# Patient Record
Sex: MALE | Race: BLACK OR AFRICAN AMERICAN | HISPANIC OR LATINO
[De-identification: names, ages, dates, MRNs, and addresses within clinical notes are randomized per-mention and may not be internally consistent; named-entity substitution may affect disease eponyms.]

---

## 2021-10-11 ENCOUNTER — TRANSCRIPTION ENCOUNTER (OUTPATIENT)
Age: 36
End: 2021-10-11

## 2022-03-14 ENCOUNTER — INPATIENT (INPATIENT)
Facility: HOSPITAL | Age: 37
LOS: 6 days | Discharge: HOME HEALTH | DRG: 871 | End: 2022-03-21
Attending: SURGERY | Admitting: SURGERY
Payer: MEDICAID

## 2022-03-14 VITALS
WEIGHT: 315 LBS | HEART RATE: 110 BPM | DIASTOLIC BLOOD PRESSURE: 72 MMHG | SYSTOLIC BLOOD PRESSURE: 118 MMHG | TEMPERATURE: 100 F | OXYGEN SATURATION: 98 % | RESPIRATION RATE: 17 BRPM | HEIGHT: 67 IN

## 2022-03-14 DIAGNOSIS — K57.80 DIVERTICULITIS OF INTESTINE, PART UNSPECIFIED, WITH PERFORATION AND ABSCESS WITHOUT BLEEDING: ICD-10-CM

## 2022-03-14 LAB
ALBUMIN SERPL ELPH-MCNC: 3.8 G/DL — SIGNIFICANT CHANGE UP (ref 3.5–5)
ALP SERPL-CCNC: 68 U/L — SIGNIFICANT CHANGE UP (ref 40–120)
ALT FLD-CCNC: 18 U/L DA — SIGNIFICANT CHANGE UP (ref 10–60)
ANION GAP SERPL CALC-SCNC: 7 MMOL/L — SIGNIFICANT CHANGE UP (ref 5–17)
AST SERPL-CCNC: 15 U/L — SIGNIFICANT CHANGE UP (ref 10–40)
BASOPHILS # BLD AUTO: 0 K/UL — SIGNIFICANT CHANGE UP (ref 0–0.2)
BASOPHILS NFR BLD AUTO: 0 % — SIGNIFICANT CHANGE UP (ref 0–2)
BILIRUB SERPL-MCNC: 1.3 MG/DL — HIGH (ref 0.2–1.2)
BUN SERPL-MCNC: 11 MG/DL — SIGNIFICANT CHANGE UP (ref 7–18)
CALCIUM SERPL-MCNC: 9.2 MG/DL — SIGNIFICANT CHANGE UP (ref 8.4–10.5)
CHLORIDE SERPL-SCNC: 102 MMOL/L — SIGNIFICANT CHANGE UP (ref 96–108)
CO2 SERPL-SCNC: 27 MMOL/L — SIGNIFICANT CHANGE UP (ref 22–31)
CREAT SERPL-MCNC: 1.16 MG/DL — SIGNIFICANT CHANGE UP (ref 0.5–1.3)
EGFR: 83 ML/MIN/1.73M2 — SIGNIFICANT CHANGE UP
EOSINOPHIL # BLD AUTO: 0 K/UL — SIGNIFICANT CHANGE UP (ref 0–0.5)
EOSINOPHIL NFR BLD AUTO: 0 % — SIGNIFICANT CHANGE UP (ref 0–6)
GLUCOSE SERPL-MCNC: 126 MG/DL — HIGH (ref 70–99)
HCT VFR BLD CALC: 45.2 % — SIGNIFICANT CHANGE UP (ref 39–50)
HGB BLD-MCNC: 14.8 G/DL — SIGNIFICANT CHANGE UP (ref 13–17)
LIDOCAIN IGE QN: 19 U/L — LOW (ref 73–393)
LYMPHOCYTES # BLD AUTO: 1.11 K/UL — SIGNIFICANT CHANGE UP (ref 1–3.3)
LYMPHOCYTES # BLD AUTO: 4 % — LOW (ref 13–44)
MCHC RBC-ENTMCNC: 27.7 PG — SIGNIFICANT CHANGE UP (ref 27–34)
MCHC RBC-ENTMCNC: 32.7 GM/DL — SIGNIFICANT CHANGE UP (ref 32–36)
MCV RBC AUTO: 84.5 FL — SIGNIFICANT CHANGE UP (ref 80–100)
MONOCYTES # BLD AUTO: 0.56 K/UL — SIGNIFICANT CHANGE UP (ref 0–0.9)
MONOCYTES NFR BLD AUTO: 2 % — SIGNIFICANT CHANGE UP (ref 2–14)
NEUTROPHILS # BLD AUTO: 26.19 K/UL — HIGH (ref 1.8–7.4)
NEUTROPHILS NFR BLD AUTO: 94 % — HIGH (ref 43–77)
PLATELET # BLD AUTO: 297 K/UL — SIGNIFICANT CHANGE UP (ref 150–400)
POTASSIUM SERPL-MCNC: 3.6 MMOL/L — SIGNIFICANT CHANGE UP (ref 3.5–5.3)
POTASSIUM SERPL-SCNC: 3.6 MMOL/L — SIGNIFICANT CHANGE UP (ref 3.5–5.3)
PROT SERPL-MCNC: 7.7 G/DL — SIGNIFICANT CHANGE UP (ref 6–8.3)
RBC # BLD: 5.35 M/UL — SIGNIFICANT CHANGE UP (ref 4.2–5.8)
RBC # FLD: 13.3 % — SIGNIFICANT CHANGE UP (ref 10.3–14.5)
SARS-COV-2 RNA SPEC QL NAA+PROBE: SIGNIFICANT CHANGE UP
SODIUM SERPL-SCNC: 136 MMOL/L — SIGNIFICANT CHANGE UP (ref 135–145)
WBC # BLD: 27.86 K/UL — HIGH (ref 3.8–10.5)
WBC # FLD AUTO: 27.86 K/UL — HIGH (ref 3.8–10.5)

## 2022-03-14 PROCEDURE — 74177 CT ABD & PELVIS W/CONTRAST: CPT | Mod: 26,ME

## 2022-03-14 PROCEDURE — 99222 1ST HOSP IP/OBS MODERATE 55: CPT

## 2022-03-14 PROCEDURE — 99285 EMERGENCY DEPT VISIT HI MDM: CPT

## 2022-03-14 PROCEDURE — G1004: CPT

## 2022-03-14 RX ORDER — SODIUM CHLORIDE 9 MG/ML
1000 INJECTION INTRAMUSCULAR; INTRAVENOUS; SUBCUTANEOUS ONCE
Refills: 0 | Status: COMPLETED | OUTPATIENT
Start: 2022-03-14 | End: 2022-03-14

## 2022-03-14 RX ORDER — KETOROLAC TROMETHAMINE 30 MG/ML
30 SYRINGE (ML) INJECTION ONCE
Refills: 0 | Status: DISCONTINUED | OUTPATIENT
Start: 2022-03-14 | End: 2022-03-14

## 2022-03-14 RX ORDER — PIPERACILLIN AND TAZOBACTAM 4; .5 G/20ML; G/20ML
3.38 INJECTION, POWDER, LYOPHILIZED, FOR SOLUTION INTRAVENOUS ONCE
Refills: 0 | Status: COMPLETED | OUTPATIENT
Start: 2022-03-14 | End: 2022-03-14

## 2022-03-14 RX ORDER — ONDANSETRON 8 MG/1
4 TABLET, FILM COATED ORAL EVERY 6 HOURS
Refills: 0 | Status: DISCONTINUED | OUTPATIENT
Start: 2022-03-14 | End: 2022-03-21

## 2022-03-14 RX ORDER — ENOXAPARIN SODIUM 100 MG/ML
40 INJECTION SUBCUTANEOUS EVERY 24 HOURS
Refills: 0 | Status: DISCONTINUED | OUTPATIENT
Start: 2022-03-14 | End: 2022-03-21

## 2022-03-14 RX ORDER — MORPHINE SULFATE 50 MG/1
4 CAPSULE, EXTENDED RELEASE ORAL ONCE
Refills: 0 | Status: DISCONTINUED | OUTPATIENT
Start: 2022-03-14 | End: 2022-03-14

## 2022-03-14 RX ORDER — ACETAMINOPHEN 500 MG
1000 TABLET ORAL ONCE
Refills: 0 | Status: COMPLETED | OUTPATIENT
Start: 2022-03-14 | End: 2022-03-14

## 2022-03-14 RX ORDER — PIPERACILLIN AND TAZOBACTAM 4; .5 G/20ML; G/20ML
3.38 INJECTION, POWDER, LYOPHILIZED, FOR SOLUTION INTRAVENOUS EVERY 8 HOURS
Refills: 0 | Status: COMPLETED | OUTPATIENT
Start: 2022-03-14 | End: 2022-03-21

## 2022-03-14 RX ORDER — SODIUM CHLORIDE 9 MG/ML
1000 INJECTION INTRAMUSCULAR; INTRAVENOUS; SUBCUTANEOUS
Refills: 0 | Status: DISCONTINUED | OUTPATIENT
Start: 2022-03-14 | End: 2022-03-18

## 2022-03-14 RX ORDER — HYDROMORPHONE HYDROCHLORIDE 2 MG/ML
0.5 INJECTION INTRAMUSCULAR; INTRAVENOUS; SUBCUTANEOUS EVERY 4 HOURS
Refills: 0 | Status: DISCONTINUED | OUTPATIENT
Start: 2022-03-14 | End: 2022-03-16

## 2022-03-14 RX ORDER — ONDANSETRON 8 MG/1
4 TABLET, FILM COATED ORAL ONCE
Refills: 0 | Status: COMPLETED | OUTPATIENT
Start: 2022-03-14 | End: 2022-03-14

## 2022-03-14 RX ADMIN — MORPHINE SULFATE 4 MILLIGRAM(S): 50 CAPSULE, EXTENDED RELEASE ORAL at 21:00

## 2022-03-14 RX ADMIN — Medication 30 MILLIGRAM(S): at 13:35

## 2022-03-14 RX ADMIN — SODIUM CHLORIDE 1000 MILLILITER(S): 9 INJECTION INTRAMUSCULAR; INTRAVENOUS; SUBCUTANEOUS at 13:15

## 2022-03-14 RX ADMIN — SODIUM CHLORIDE 150 MILLILITER(S): 9 INJECTION INTRAMUSCULAR; INTRAVENOUS; SUBCUTANEOUS at 21:53

## 2022-03-14 RX ADMIN — ONDANSETRON 4 MILLIGRAM(S): 8 TABLET, FILM COATED ORAL at 13:15

## 2022-03-14 RX ADMIN — Medication 30 MILLIGRAM(S): at 13:15

## 2022-03-14 RX ADMIN — MORPHINE SULFATE 4 MILLIGRAM(S): 50 CAPSULE, EXTENDED RELEASE ORAL at 20:22

## 2022-03-14 RX ADMIN — PIPERACILLIN AND TAZOBACTAM 3.38 GRAM(S): 4; .5 INJECTION, POWDER, LYOPHILIZED, FOR SOLUTION INTRAVENOUS at 21:00

## 2022-03-14 RX ADMIN — Medication 400 MILLIGRAM(S): at 23:55

## 2022-03-14 RX ADMIN — PIPERACILLIN AND TAZOBACTAM 200 GRAM(S): 4; .5 INJECTION, POWDER, LYOPHILIZED, FOR SOLUTION INTRAVENOUS at 20:22

## 2022-03-14 NOTE — ED PROVIDER NOTE - OBJECTIVE STATEMENT
Patient reports constipation and LLQ pain starting on 3/9. On 3/11, patient took imodium (misunderstood the indication). Pain continued to get worse. Yesterday, took ducolax. Became nauseous. Vomited overnight, NBNB. Decreased appetite and chills for 2 days. Has urinated twice today. No fever, cp, sob, diarrhea, dysuria, urgency, frequency.  Triage wrote "fever" but patient never check temperature.

## 2022-03-14 NOTE — H&P ADULT - NSHPADDITIONALINFOADULT_GEN_ALL_CORE
pt with tenderness in the left LLQ abdomen  Clinically stable  Discussed the options with the pt  ID involved pt with tenderness in the left LLQ abdomen  Clinically stable  Discussed the options with the pt  ID involved    Pt had sepsis on arrival  elevated wbc count, high fever and tachycardia

## 2022-03-14 NOTE — ED ADULT NURSE NOTE - NSFALLRSKASSESSDT_ED_ALL_ED
14-Mar-2022 13:47 Eyes with no visual disturbances.  Ears clean and dry and no hearing difficulties. Nose with pink mucosa and no drainage.  Mouth mucous membranes moist and pink.  No tenderness or swelling to throat or neck.

## 2022-03-14 NOTE — H&P ADULT - ASSESSMENT
35 yo M with perforated sigmoid colon with extraluminal air, unknown etiology. Likely contained considering chronicity. Pt without signs of peritonitis.    1. Admit to surgery  2. NPO  3. IV fluids  4. IV antibiotics  5. ID consult  6. Serial abd exams  7. D/w Dr. Lee and agreed

## 2022-03-14 NOTE — H&P ADULT - NEGATIVE GENERAL GENITOURINARY SYMPTOMS
no hematuria/no flank pain L/no flank pain R/no gas in urine/no incontinence/normal urinary frequency

## 2022-03-14 NOTE — H&P ADULT - HISTORY OF PRESENT ILLNESS
37 yo M prediabetic presents c/o left lower abdominal pain that began 3/9 after eating.  Pt states he had constipation and then took imodium. Pt states pain worsened. He then took dulcolax and then developed vomiting.  Pt states the pain worsened prompting him to come to emergency room. PT states pain is worse with movement and improves when he stays still. Pt admits to chills but denies fever.  Pt admits to similar pain in 2018 which resolved spontaneously. Admits to having BM/Flatus. + voiding + dysuria, urine dark

## 2022-03-14 NOTE — ED ADULT NURSE NOTE - OBJECTIVE STATEMENT
presented to the ED sent from urgent care w/ a 3 day of generalized abdominal pain associated with constipation , nausea and vomiting . reports took Dulcolax w/ very minimal relief of discomforts and constipation .

## 2022-03-14 NOTE — ED PROVIDER NOTE - PROGRESS NOTE DETAILS
Patient is resting comfortably, NAD. Signed out to Dr. Hudson pending CTAP Hudson: surgery called and ct shows perforated sigmoid. zosyn given. admit to surgery

## 2022-03-15 ENCOUNTER — TRANSCRIPTION ENCOUNTER (OUTPATIENT)
Age: 37
End: 2022-03-15

## 2022-03-15 LAB
ANION GAP SERPL CALC-SCNC: 5 MMOL/L — SIGNIFICANT CHANGE UP (ref 5–17)
BASOPHILS # BLD AUTO: 0 K/UL — SIGNIFICANT CHANGE UP (ref 0–0.2)
BASOPHILS NFR BLD AUTO: 0 % — SIGNIFICANT CHANGE UP (ref 0–2)
BUN SERPL-MCNC: 12 MG/DL — SIGNIFICANT CHANGE UP (ref 7–18)
CALCIUM SERPL-MCNC: 8 MG/DL — LOW (ref 8.4–10.5)
CHLORIDE SERPL-SCNC: 107 MMOL/L — SIGNIFICANT CHANGE UP (ref 96–108)
CO2 SERPL-SCNC: 28 MMOL/L — SIGNIFICANT CHANGE UP (ref 22–31)
CREAT SERPL-MCNC: 0.89 MG/DL — SIGNIFICANT CHANGE UP (ref 0.5–1.3)
EGFR: 112 ML/MIN/1.73M2 — SIGNIFICANT CHANGE UP
EOSINOPHIL # BLD AUTO: 0 K/UL — SIGNIFICANT CHANGE UP (ref 0–0.5)
EOSINOPHIL NFR BLD AUTO: 0 % — SIGNIFICANT CHANGE UP (ref 0–6)
GLUCOSE SERPL-MCNC: 105 MG/DL — HIGH (ref 70–99)
HCT VFR BLD CALC: 39.6 % — SIGNIFICANT CHANGE UP (ref 39–50)
HGB BLD-MCNC: 12.6 G/DL — LOW (ref 13–17)
LYMPHOCYTES # BLD AUTO: 0.89 K/UL — LOW (ref 1–3.3)
LYMPHOCYTES # BLD AUTO: 4 % — LOW (ref 13–44)
MCHC RBC-ENTMCNC: 27.8 PG — SIGNIFICANT CHANGE UP (ref 27–34)
MCHC RBC-ENTMCNC: 31.8 GM/DL — LOW (ref 32–36)
MCV RBC AUTO: 87.2 FL — SIGNIFICANT CHANGE UP (ref 80–100)
MONOCYTES # BLD AUTO: 0 K/UL — SIGNIFICANT CHANGE UP (ref 0–0.9)
MONOCYTES NFR BLD AUTO: 0 % — LOW (ref 2–14)
NEUTROPHILS # BLD AUTO: 20.99 K/UL — HIGH (ref 1.8–7.4)
NEUTROPHILS NFR BLD AUTO: 90 % — HIGH (ref 43–77)
PLATELET # BLD AUTO: 246 K/UL — SIGNIFICANT CHANGE UP (ref 150–400)
POTASSIUM SERPL-MCNC: 3.4 MMOL/L — LOW (ref 3.5–5.3)
POTASSIUM SERPL-SCNC: 3.4 MMOL/L — LOW (ref 3.5–5.3)
RBC # BLD: 4.54 M/UL — SIGNIFICANT CHANGE UP (ref 4.2–5.8)
RBC # FLD: 13.8 % — SIGNIFICANT CHANGE UP (ref 10.3–14.5)
SODIUM SERPL-SCNC: 140 MMOL/L — SIGNIFICANT CHANGE UP (ref 135–145)
WBC # BLD: 22.33 K/UL — HIGH (ref 3.8–10.5)
WBC # FLD AUTO: 22.33 K/UL — HIGH (ref 3.8–10.5)

## 2022-03-15 PROCEDURE — 99232 SBSQ HOSP IP/OBS MODERATE 35: CPT

## 2022-03-15 RX ORDER — SODIUM CHLORIDE 9 MG/ML
1000 INJECTION INTRAMUSCULAR; INTRAVENOUS; SUBCUTANEOUS ONCE
Refills: 0 | Status: COMPLETED | OUTPATIENT
Start: 2022-03-15 | End: 2022-03-15

## 2022-03-15 RX ORDER — ACETAMINOPHEN 500 MG
1000 TABLET ORAL ONCE
Refills: 0 | Status: COMPLETED | OUTPATIENT
Start: 2022-03-15 | End: 2022-03-15

## 2022-03-15 RX ORDER — ACETAMINOPHEN 500 MG
650 TABLET ORAL EVERY 6 HOURS
Refills: 0 | Status: DISCONTINUED | OUTPATIENT
Start: 2022-03-15 | End: 2022-03-21

## 2022-03-15 RX ADMIN — ONDANSETRON 4 MILLIGRAM(S): 8 TABLET, FILM COATED ORAL at 16:46

## 2022-03-15 RX ADMIN — PIPERACILLIN AND TAZOBACTAM 25 GRAM(S): 4; .5 INJECTION, POWDER, LYOPHILIZED, FOR SOLUTION INTRAVENOUS at 05:20

## 2022-03-15 RX ADMIN — SODIUM CHLORIDE 1000 MILLILITER(S): 9 INJECTION INTRAMUSCULAR; INTRAVENOUS; SUBCUTANEOUS at 02:40

## 2022-03-15 RX ADMIN — Medication 1000 MILLIGRAM(S): at 15:07

## 2022-03-15 RX ADMIN — PIPERACILLIN AND TAZOBACTAM 25 GRAM(S): 4; .5 INJECTION, POWDER, LYOPHILIZED, FOR SOLUTION INTRAVENOUS at 21:28

## 2022-03-15 RX ADMIN — Medication 650 MILLIGRAM(S): at 19:41

## 2022-03-15 RX ADMIN — Medication 1000 MILLIGRAM(S): at 08:14

## 2022-03-15 RX ADMIN — PIPERACILLIN AND TAZOBACTAM 25 GRAM(S): 4; .5 INJECTION, POWDER, LYOPHILIZED, FOR SOLUTION INTRAVENOUS at 15:39

## 2022-03-15 RX ADMIN — Medication 400 MILLIGRAM(S): at 11:04

## 2022-03-15 RX ADMIN — SODIUM CHLORIDE 150 MILLILITER(S): 9 INJECTION INTRAMUSCULAR; INTRAVENOUS; SUBCUTANEOUS at 21:33

## 2022-03-15 RX ADMIN — Medication 650 MILLIGRAM(S): at 20:39

## 2022-03-15 NOTE — CONSULT NOTE ADULT - ASSESSMENT
Acute Perforated Diverticulitis  Focal peritonitis  Fevers  Leukocytosis - decreasing    Plan - Cont Zosyn 3.375 gms iv q8hrs  Repeat CT abdomen and pelvis with oral contrast on Thursday, if no abscess and finding improving then will plan to transition to po abxs upon discharge.

## 2022-03-15 NOTE — PROGRESS NOTE ADULT - SUBJECTIVE AND OBJECTIVE BOX
SUBJECTIVE: Pt reports pain has improved, but still has some left lower quadrant pain. Also reports some nausea because he states he is "burning up." Denies vomiting.     Vitals reviewed, Tmax 102.5F last night.     Exam:   General: awake, alert, appears comforatble  Respiratory: non-labored  Abdomen: obese, soft, tenderness elicited in LLQ upon palpation of left upper quadrant; no guarding, non-peritonitic

## 2022-03-15 NOTE — CHART NOTE - NSCHARTNOTEFT_GEN_A_CORE
Pt previously with fever 102.5  Resolved with IV Tylenol  BP soft - NS bolus given    Pt states feels better  Denies dizziness, headache  Abdominal pain improving  Not tender, not distended    Monitor BP

## 2022-03-15 NOTE — PROGRESS NOTE ADULT - ASSESSMENT
36M with perforated sigmoid colon with extraluminal air of unknown etiology  abdomen not acute at this time, non-peritonitic on exam    continue bowel rest: NPO/IVF  ID consult Dr. Mclaughlin  IV abx  discussed with Dr. Lee

## 2022-03-15 NOTE — DISCHARGE NOTE PROVIDER - NSDCMRMEDTOKEN_GEN_ALL_CORE_FT
cefuroxime 500 mg oral tablet: 1 tab(s) orally 2 times a day   metroNIDAZOLE 500 mg oral tablet: 1 tab(s) orally 2 times a day

## 2022-03-15 NOTE — DISCHARGE NOTE PROVIDER - HOSPITAL COURSE
36M prediabetic pt presents c/o left lower abdominal pain that began 3/9 after eating.  Pt states he had constipation and then took imodium. Pt states pain worsened. He then took dulcolax and then developed vomiting.  Pt states the pain worsened prompting him to come to emergency room. PT states pain is worse with movement and improves when he stays still. Pt admits to chills but denies fever.  Pt admits to similar pain in 2018 which resolved spontaneously. Admits to having BM/Flatus. + voiding + dysuria, urine dark. Pt underwent a CT scan that showed marked wall thickening of the proximal sigmoid colon with significant surrounding edema, localized adjacent extraluminal gas and subcentimeter lymph nodes. Findings are compatible with perforated colitis; underlying tumor cannot be excluded. No definite colonic diverticulosis is evident. No formed abscess at this time. Follow-up colonoscopy is recommended for further evaluation. Pt was admitted, was kept NPO, given IVF, IV abx and was evaluated by the infectious disease team.   INCOMPLETE   36M prediabetic pt presents c/o left lower abdominal pain that began 3/9 after eating.  Pt states he had constipation and then took imodium. Pt states pain worsened. He then took dulcolax and then developed vomiting.  Pt states the pain worsened prompting him to come to emergency room. PT states pain is worse with movement and improves when he stays still. Pt admits to chills but denies fever.  Pt admits to similar pain in 2018 which resolved spontaneously. Admits to having BM/Flatus. + voiding + dysuria, urine dark. Pt underwent a CT scan that showed marked wall thickening of the proximal sigmoid colon with significant surrounding edema, localized adjacent extraluminal gas and subcentimeter lymph nodes. Findings are compatible with perforated colitis; underlying tumor cannot be excluded. No definite colonic diverticulosis is evident. No formed abscess at this time. Follow-up colonoscopy is recommended for further evaluation. Pt was admitted, was kept NPO, given IVF, IV abx and was evaluated by the infectious disease team. Pt diet was advanced and tolerated regular diet without pain. Leukocytosis trended downward and pt planned for discharge 3-20-22

## 2022-03-15 NOTE — DISCHARGE NOTE PROVIDER - NSDCCPCAREPLAN_GEN_ALL_CORE_FT
PRINCIPAL DISCHARGE DIAGNOSIS  Diagnosis: Perforated diverticulum of intestine  Assessment and Plan of Treatment: Please follow-up with your surgeon Dr. Lee in the office upon discharge.       PRINCIPAL DISCHARGE DIAGNOSIS  Diagnosis: Perforated diverticulum of intestine  Assessment and Plan of Treatment: Please follow-up with your surgeon Dr. Lee in the office upon discharge.  Drink plenty of fluids and rest as needed. Call for any fever over 101, nausea, vomiting, severe pain, no passing of gas or bowel movement.   DIET  You may resume your regular diet as normal.   PAIN CONTROL  You may take Motrin or Tylenol, over the counter, as needed for mild pain. Stagger one medication 3 hours after the other for maximum pain control. Maximum daily dose of Tylenol should not exceed 4grams/day.  Antibiotics:   Please sienna Ceftin and Flagyl for 2 weeks. No alcohol consumption while taking the antibiotics as it can cause an adverse reactions.

## 2022-03-15 NOTE — CONSULT NOTE ADULT - SUBJECTIVE AND OBJECTIVE BOX
HPI:  37 yo M prediabetic presents c/o left lower abdominal pain that began 3/9 after eating.  Pt states he had constipation and then took imodium. Pt states pain worsened. He then took dulcolax and then developed vomiting.  Pt states the pain worsened prompting him to come to emergency room. PT states pain is worse with movement and improves when he stays still. Pt admits to chills but denies fever.  Pt admits to similar pain in 2018 which resolved spontaneously. Admits to having BM/Flatus. + voiding + dysuria, urine dark (14 Mar 2022 20:50)      PAST MEDICAL & SURGICAL HISTORY:  No pertinent past medical history    No significant past surgical history        No Known Drug Allergies  Shrimp (Hives)      Meds:  enoxaparin Injectable 40 milliGRAM(s) SubCutaneous every 24 hours  HYDROmorphone  Injectable 0.5 milliGRAM(s) IV Push every 4 hours PRN  ondansetron Injectable 4 milliGRAM(s) IV Push every 6 hours PRN  piperacillin/tazobactam IVPB.. 3.375 Gram(s) IV Intermittent every 8 hours  sodium chloride 0.9%. 1000 milliLiter(s) IV Continuous <Continuous>      SOCIAL HISTORY:  Smoker:  YES / NO        PACK YEARS:                         WHEN QUIT?  ETOH use:  YES / NO               FREQUENCY / QUANTITY:  Ilicit Drug use:  YES / NO  Occupation:  Assisted device use (Cane / Walker):  Live with:    FAMILY HISTORY:      VITALS:  Vital Signs Last 24 Hrs  T(C): 37.2 (15 Mar 2022 14:26), Max: 39.2 (14 Mar 2022 23:10)  T(F): 98.9 (15 Mar 2022 14:26), Max: 102.5 (14 Mar 2022 23:10)  HR: 95 (15 Mar 2022 14:26) (95 - 120)  BP: 130/77 (15 Mar 2022 14:26) (94/61 - 134/84)  BP(mean): --  RR: 18 (15 Mar 2022 14:26) (17 - 20)  SpO2: 95% (15 Mar 2022 14:26) (95% - 98%)    LABS/DIAGNOSTIC TESTS:                          12.6   22.33 )-----------( 246      ( 15 Mar 2022 05:49 )             39.6     WBC Count: 22.33 K/uL (03-15 @ 05:49)  WBC Count: 27.86 K/uL (03-14 @ 13:32)      03-15    140  |  107  |  12  ----------------------------<  105<H>  3.4<L>   |  28  |  0.89    Ca    8.0<L>      15 Mar 2022 05:49    TPro  7.7  /  Alb  3.8  /  TBili  1.3<H>  /  DBili  x   /  AST  15  /  ALT  18  /  AlkPhos  68  03-14          LIVER FUNCTIONS - ( 14 Mar 2022 13:32 )  Alb: 3.8 g/dL / Pro: 7.7 g/dL / ALK PHOS: 68 U/L / ALT: 18 U/L DA / AST: 15 U/L / GGT: x                 LACTATE:    ABG -     CULTURES:       RADIOLOGY:< from: CT Abdomen and Pelvis w/ IV Cont (03.14.22 @ 18:53) >  ACC: [id number] EXAM:  CT ABDOMEN AND PELVIS IC                          PROCEDURE DATE:  03/14/2022          INTERPRETATION:  CLINICAL INFORMATION: Abdominal pain, fever.    COMPARISON: None.    CONTRAST/COMPLICATIONS:  IV Contrast: Omnipaque 350  90 cc administered   10 cc discarded  Oral Contrast: NONE  Complications: None reported at time of study completion    PROCEDURE:  CT of the Abdomen and Pelvis was performed.  Sagittal and coronal reformats were performed.    FINDINGS:  LOWER CHEST: Linear bibasilar atelectasis.    LIVER: Within normal limits.  BILE DUCTS: Normal caliber.  GALLBLADDER: Within normal limits.  SPLEEN: Within normal limits.  PANCREAS: Fatty changes.  ADRENALS: Within normal limits.  KIDNEYS/URETERS: Within normal limits.    BLADDER: Within normal limits.  REPRODUCTIVE ORGANS: Prostate within normal limits.    BOWEL: Marked wall thickening noted in the proximal sigmoid colon with   mild adjacent localized extraluminal air and significant surrounding   edema. No distant pneumoperitoneum. No discrete rim-enhancing fluid   collection seen at this time. Several small, subcentimeter adjacent lymph   nodes noted in the sigmoid mesocolon. No bowel obstruction. Appendix is   normal.  PERITONEUM: No ascites.  VESSELS: Within normal limits.  RETROPERITONEUM/LYMPH NODES: No lymphadenopathy.  ABDOMINAL WALL: Small fat-containing umbilical hernia.  BONES: Within normal limits.    IMPRESSION:  Marked wall thickening of the proximal sigmoid colon with significant   surrounding edema, localized adjacent extraluminal gas and subcentimeter   lymph nodes. Findings are compatible with perforated colitis; underlying   tumor cannot be excluded. No definite colonic diverticulosis is evident.   No formed abscess at this time.    Follow-up colonoscopy is recommended for further evaluation.        --- End of Report ---            NILS NUÑEZ MD; Attending Radiologist  This document has been electronically signed. Mar 14 2022  7:32PM    < end of copied text >        ROS  [  ] UNABLE TO ELICIT               HPI:  37 yo M prediabetic presents c/o left lower abdominal pain that began 3/9 after eating.  Pt states he had constipation and then took imodium. Pt states pain worsened. He then took dulcolax and then developed vomiting.  Pt states the pain worsened prompting him to come to emergency room. PT states pain is worse with movement and improves when he stays still. Pt admits to chills but denies fever.  Pt admits to similar pain in 2018 which resolved spontaneously. Admits to having BM/Flatus. + voiding + dysuria, urine dark (14 Mar 2022 20:50)      History as above, pt who has a PMH of obesity and prediabetes only presents with abdominal pain x 5 days along with constipation  and vomited x 1 day. He denies any fevers but was having chills. He had a CT abdomen here and was found to have acute Diverticulitis with a microperforation and some free air. He c/o pain still and feeling very thirsty. He was found to have a temp of 102.5 here and an elevated WBC count.         PAST MEDICAL & SURGICAL HISTORY:  No pertinent past medical history    No significant past surgical history        No Known Drug Allergies  Shrimp (Hives)      Meds:  enoxaparin Injectable 40 milliGRAM(s) SubCutaneous every 24 hours  HYDROmorphone  Injectable 0.5 milliGRAM(s) IV Push every 4 hours PRN  ondansetron Injectable 4 milliGRAM(s) IV Push every 6 hours PRN  piperacillin/tazobactam IVPB.. 3.375 Gram(s) IV Intermittent every 8 hours  sodium chloride 0.9%. 1000 milliLiter(s) IV Continuous <Continuous>      SOCIAL HISTORY:  Smoker:  no  ETOH use:  YES, socially  Ilicit Drug use:  NO      FAMILY HISTORY: not sig      VITALS:  Vital Signs Last 24 Hrs  T(C): 37.2 (15 Mar 2022 14:26), Max: 39.2 (14 Mar 2022 23:10)  T(F): 98.9 (15 Mar 2022 14:26), Max: 102.5 (14 Mar 2022 23:10)  HR: 95 (15 Mar 2022 14:26) (95 - 120)  BP: 130/77 (15 Mar 2022 14:26) (94/61 - 134/84)  BP(mean): --  RR: 18 (15 Mar 2022 14:26) (17 - 20)  SpO2: 95% (15 Mar 2022 14:26) (95% - 98%)    LABS/DIAGNOSTIC TESTS:                          12.6   22.33 )-----------( 246      ( 15 Mar 2022 05:49 )             39.6     WBC Count: 22.33 K/uL (03-15 @ 05:49)  WBC Count: 27.86 K/uL (03-14 @ 13:32)      03-15    140  |  107  |  12  ----------------------------<  105<H>  3.4<L>   |  28  |  0.89    Ca    8.0<L>      15 Mar 2022 05:49    TPro  7.7  /  Alb  3.8  /  TBili  1.3<H>  /  DBili  x   /  AST  15  /  ALT  18  /  AlkPhos  68  03-14          LIVER FUNCTIONS - ( 14 Mar 2022 13:32 )  Alb: 3.8 g/dL / Pro: 7.7 g/dL / ALK PHOS: 68 U/L / ALT: 18 U/L DA / AST: 15 U/L / GGT: x                 LACTATE:    ABG -     CULTURES:       RADIOLOGY:< from: CT Abdomen and Pelvis w/ IV Cont (03.14.22 @ 18:53) >  ACC: [id number] EXAM:  CT ABDOMEN AND PELVIS IC                          PROCEDURE DATE:  03/14/2022          INTERPRETATION:  CLINICAL INFORMATION: Abdominal pain, fever.    COMPARISON: None.    CONTRAST/COMPLICATIONS:  IV Contrast: Omnipaque 350  90 cc administered   10 cc discarded  Oral Contrast: NONE  Complications: None reported at time of study completion    PROCEDURE:  CT of the Abdomen and Pelvis was performed.  Sagittal and coronal reformats were performed.    FINDINGS:  LOWER CHEST: Linear bibasilar atelectasis.    LIVER: Within normal limits.  BILE DUCTS: Normal caliber.  GALLBLADDER: Within normal limits.  SPLEEN: Within normal limits.  PANCREAS: Fatty changes.  ADRENALS: Within normal limits.  KIDNEYS/URETERS: Within normal limits.    BLADDER: Within normal limits.  REPRODUCTIVE ORGANS: Prostate within normal limits.    BOWEL: Marked wall thickening noted in the proximal sigmoid colon with   mild adjacent localized extraluminal air and significant surrounding   edema. No distant pneumoperitoneum. No discrete rim-enhancing fluid   collection seen at this time. Several small, subcentimeter adjacent lymph   nodes noted in the sigmoid mesocolon. No bowel obstruction. Appendix is   normal.  PERITONEUM: No ascites.  VESSELS: Within normal limits.  RETROPERITONEUM/LYMPH NODES: No lymphadenopathy.  ABDOMINAL WALL: Small fat-containing umbilical hernia.  BONES: Within normal limits.    IMPRESSION:  Marked wall thickening of the proximal sigmoid colon with significant   surrounding edema, localized adjacent extraluminal gas and subcentimeter   lymph nodes. Findings are compatible with perforated colitis; underlying   tumor cannot be excluded. No definite colonic diverticulosis is evident.   No formed abscess at this time.    Follow-up colonoscopy is recommended for further evaluation.        --- End of Report ---            NILS NUÑEZ MD; Attending Radiologist  This document has been electronically signed. Mar 14 2022  7:32PM    < end of copied text >        ROS  [  ] UNABLE TO ELICIT

## 2022-03-15 NOTE — DISCHARGE NOTE PROVIDER - CARE PROVIDER_API CALL
Tommie Lee (MD)  Surgery  [address] [place], [place] [id number]  Phone: [phone]  Fax: [phone]  Follow Up Time:

## 2022-03-15 NOTE — PROGRESS NOTE ADULT - NSPROGADDITIONALINFOA_GEN_ALL_CORE
NPO  IV hydration  IV abx pt presents to ED while at work natural gas was leaking from a pipe. pt c/o sore throat dizzy and lightheadedness

## 2022-03-16 LAB
ANION GAP SERPL CALC-SCNC: 7 MMOL/L — SIGNIFICANT CHANGE UP (ref 5–17)
BUN SERPL-MCNC: 11 MG/DL — SIGNIFICANT CHANGE UP (ref 7–18)
CALCIUM SERPL-MCNC: 8.4 MG/DL — SIGNIFICANT CHANGE UP (ref 8.4–10.5)
CHLORIDE SERPL-SCNC: 106 MMOL/L — SIGNIFICANT CHANGE UP (ref 96–108)
CO2 SERPL-SCNC: 26 MMOL/L — SIGNIFICANT CHANGE UP (ref 22–31)
CREAT SERPL-MCNC: 0.94 MG/DL — SIGNIFICANT CHANGE UP (ref 0.5–1.3)
EGFR: 106 ML/MIN/1.73M2 — SIGNIFICANT CHANGE UP
GLUCOSE SERPL-MCNC: 96 MG/DL — SIGNIFICANT CHANGE UP (ref 70–99)
HCT VFR BLD CALC: 37.8 % — LOW (ref 39–50)
HGB BLD-MCNC: 12.3 G/DL — LOW (ref 13–17)
MCHC RBC-ENTMCNC: 28 PG — SIGNIFICANT CHANGE UP (ref 27–34)
MCHC RBC-ENTMCNC: 32.5 GM/DL — SIGNIFICANT CHANGE UP (ref 32–36)
MCV RBC AUTO: 86.1 FL — SIGNIFICANT CHANGE UP (ref 80–100)
NRBC # BLD: 0 /100 WBCS — SIGNIFICANT CHANGE UP (ref 0–0)
PLATELET # BLD AUTO: 262 K/UL — SIGNIFICANT CHANGE UP (ref 150–400)
POTASSIUM SERPL-MCNC: 3.2 MMOL/L — LOW (ref 3.5–5.3)
POTASSIUM SERPL-SCNC: 3.2 MMOL/L — LOW (ref 3.5–5.3)
RBC # BLD: 4.39 M/UL — SIGNIFICANT CHANGE UP (ref 4.2–5.8)
RBC # FLD: 13.5 % — SIGNIFICANT CHANGE UP (ref 10.3–14.5)
SODIUM SERPL-SCNC: 139 MMOL/L — SIGNIFICANT CHANGE UP (ref 135–145)
WBC # BLD: 21.37 K/UL — HIGH (ref 3.8–10.5)
WBC # FLD AUTO: 21.37 K/UL — HIGH (ref 3.8–10.5)

## 2022-03-16 PROCEDURE — 99232 SBSQ HOSP IP/OBS MODERATE 35: CPT

## 2022-03-16 RX ORDER — POTASSIUM CHLORIDE 20 MEQ
10 PACKET (EA) ORAL
Refills: 0 | Status: COMPLETED | OUTPATIENT
Start: 2022-03-16 | End: 2022-03-16

## 2022-03-16 RX ADMIN — ENOXAPARIN SODIUM 40 MILLIGRAM(S): 100 INJECTION SUBCUTANEOUS at 11:13

## 2022-03-16 RX ADMIN — PIPERACILLIN AND TAZOBACTAM 25 GRAM(S): 4; .5 INJECTION, POWDER, LYOPHILIZED, FOR SOLUTION INTRAVENOUS at 22:11

## 2022-03-16 RX ADMIN — HYDROMORPHONE HYDROCHLORIDE 0.5 MILLIGRAM(S): 2 INJECTION INTRAMUSCULAR; INTRAVENOUS; SUBCUTANEOUS at 13:53

## 2022-03-16 RX ADMIN — PIPERACILLIN AND TAZOBACTAM 25 GRAM(S): 4; .5 INJECTION, POWDER, LYOPHILIZED, FOR SOLUTION INTRAVENOUS at 16:02

## 2022-03-16 RX ADMIN — Medication 650 MILLIGRAM(S): at 18:52

## 2022-03-16 RX ADMIN — Medication 100 MILLIEQUIVALENT(S): at 12:11

## 2022-03-16 RX ADMIN — Medication 650 MILLIGRAM(S): at 20:00

## 2022-03-16 RX ADMIN — Medication 100 MILLIEQUIVALENT(S): at 11:14

## 2022-03-16 RX ADMIN — PIPERACILLIN AND TAZOBACTAM 25 GRAM(S): 4; .5 INJECTION, POWDER, LYOPHILIZED, FOR SOLUTION INTRAVENOUS at 05:24

## 2022-03-16 RX ADMIN — HYDROMORPHONE HYDROCHLORIDE 0.5 MILLIGRAM(S): 2 INJECTION INTRAMUSCULAR; INTRAVENOUS; SUBCUTANEOUS at 18:52

## 2022-03-16 RX ADMIN — HYDROMORPHONE HYDROCHLORIDE 0.5 MILLIGRAM(S): 2 INJECTION INTRAMUSCULAR; INTRAVENOUS; SUBCUTANEOUS at 20:00

## 2022-03-16 RX ADMIN — HYDROMORPHONE HYDROCHLORIDE 0.5 MILLIGRAM(S): 2 INJECTION INTRAMUSCULAR; INTRAVENOUS; SUBCUTANEOUS at 13:23

## 2022-03-16 RX ADMIN — Medication 100 MILLIEQUIVALENT(S): at 13:26

## 2022-03-16 NOTE — PROGRESS NOTE ADULT - SUBJECTIVE AND OBJECTIVE BOX
36y Male    Meds:  piperacillin/tazobactam IVPB.. 3.375 Gram(s) IV Intermittent every 8 hours    Allergies    No Known Drug Allergies  Shrimp (Hives)    Intolerances        VITALS:  Vital Signs Last 24 Hrs  T(C): 39.3 (16 Mar 2022 18:46), Max: 39.3 (16 Mar 2022 18:46)  T(F): 102.8 (16 Mar 2022 18:46), Max: 102.8 (16 Mar 2022 18:46)  HR: 100 (16 Mar 2022 14:23) (100 - 101)  BP: 119/72 (16 Mar 2022 14:23) (119/72 - 151/65)  BP(mean): --  RR: 16 (16 Mar 2022 14:23) (16 - 618)  SpO2: 100% (16 Mar 2022 14:23) (98% - 100%)    LABS/DIAGNOSTIC TESTS:                          12.3   21.37 )-----------( 262      ( 16 Mar 2022 06:31 )             37.8         03-16    139  |  106  |  11  ----------------------------<  96  3.2<L>   |  26  |  0.94    Ca    8.4      16 Mar 2022 06:31            CULTURES:       RADIOLOGY:      ROS:  [  ] UNABLE TO ELICIT 36y Male who is looking much better today and is feeling much better also, he has less abdominal pain , he has no nausea or vomiting, no BM but is passing flatus, he had a temp to 102.8 this evening though. He was started on clears today and is tolerating it.    Meds:  piperacillin/tazobactam IVPB.. 3.375 Gram(s) IV Intermittent every 8 hours    Allergies    No Known Drug Allergies  Shrimp (Hives)    Intolerances        VITALS:  Vital Signs Last 24 Hrs  T(C): 39.3 (16 Mar 2022 18:46), Max: 39.3 (16 Mar 2022 18:46)  T(F): 102.8 (16 Mar 2022 18:46), Max: 102.8 (16 Mar 2022 18:46)  HR: 100 (16 Mar 2022 14:23) (100 - 101)  BP: 119/72 (16 Mar 2022 14:23) (119/72 - 151/65)  BP(mean): --  RR: 16 (16 Mar 2022 14:23) (16 - 618)  SpO2: 100% (16 Mar 2022 14:23) (98% - 100%)    LABS/DIAGNOSTIC TESTS:                          12.3   21.37 )-----------( 262      ( 16 Mar 2022 06:31 )             37.8         03-16    139  |  106  |  11  ----------------------------<  96  3.2<L>   |  26  |  0.94    Ca    8.4      16 Mar 2022 06:31            CULTURES:       RADIOLOGY:      ROS:  [  ] UNABLE TO ELICIT

## 2022-03-16 NOTE — PROGRESS NOTE ADULT - SUBJECTIVE AND OBJECTIVE BOX
Patient seen and examined at bedside  Admits to minimal pain, which is improved.   Admits to flatus/BM.   Denies nausea/ vomiting.     Vital Signs Last 24 Hrs  T(F): 98.1 (03-16-22 @ 06:14), Max: 98.9 (03-15-22 @ 14:26)  HR: 101 (03-16-22 @ 06:14)  BP: 151/65 (03-16-22 @ 06:14)  RR: 18 (03-16-22 @ 06:14)  SpO2: 98% (03-16-22 @ 06:14)    GENERAL: Alert, NAD  CHEST/LUNG: Respirations nonlabored  ABDOMEN: soft, mild LLQ tenderness to palpation, Nondistended  EXTREMITIES:  no calf tenderness, No edema    I&O's Detail    LABS:                        12.3   21.37 )-----------( 262      ( 16 Mar 2022 06:31 )             37.8     03-16    139  |  106  |  11  ----------------------------<  96  3.2<L>   |  26  |  0.94    Ca    8.4      16 Mar 2022 06:31    TPro  7.7  /  Alb  3.8  /  TBili  1.3<H>  /  DBili  x   /  AST  15  /  ALT  18  /  AlkPhos  68  03-14

## 2022-03-16 NOTE — PROGRESS NOTE ADULT - NSPROGADDITIONALINFOA_GEN_ALL_CORE
clinically much better  I have personally seen and examined the patient.  I fully participated in the care of this patient.  I have made amendments to the documentation where necessary, and agree with the history, physical exam, and plan as documented by the ACP.

## 2022-03-16 NOTE — PROGRESS NOTE ADULT - ASSESSMENT
36 year old male with left perforated sigmoid diverticulitis, likely contained  leukocytosis slightly down trending, afebrile. Mild tachycardia    - possible advance to clear liquid diet  - monitor labs and vital signs  - continue IV antibiotics  - pain control PRN   - discuss with Dr. eLe

## 2022-03-16 NOTE — PATIENT PROFILE ADULT - FALL HARM RISK - UNIVERSAL INTERVENTIONS
Bed in lowest position, wheels locked, appropriate side rails in place/Call bell, personal items and telephone in reach/Instruct patient to call for assistance before getting out of bed or chair/Non-slip footwear when patient is out of bed/[place] to call system/Physically safe environment - no spills, clutter or unnecessary equipment/Purposeful Proactive Rounding/Room/bathroom lighting operational, light cord in reach

## 2022-03-16 NOTE — PROGRESS NOTE ADULT - ASSESSMENT
Acute Perforated Diverticulitis  Focal peritonitis  Fevers  Leukocytosis - decreasing slowly    Plan - Cont Zosyn 3.375 gms iv q8hrs  Repeat CT abdomen and pelvis with oral contrast tomorrow.

## 2022-03-16 NOTE — PATIENT PROFILE ADULT - FALL HARM RISK - TYPE OF ASSESSMENT
"Individual Psychotherapy (PhD/LCSW)    3/18/2019    Site:  [facility]         Therapeutic Intervention: Met with patient.  Outpatient - Insight oriented psychotherapy 45 min - CPT code 20936 and Outpatient - Supportive psychotherapy 45 min - CPT Code 01081    Chief complaint/reason for encounter: depression and mood swings     Interval history and content of current session: Patient presents for the first time since her initial evaluation last month.  She reports she is doing "ok" today, but cites some sporadic depressive symptoms and increased worry.  93 year old mother is in hospice, and patient worries she will not be able care for mother.  Patient's  helps, but she also worries  may get sick or die, and she will be left to care for mother by herself.  Processed anxiety and discussed coping skills, so she can avoid caregiver burnout.  Also, emphasized the importance of making good self care a priority.      Treatment plan:  · Target symptoms: depression, mood swings  · Why chosen therapy is appropriate versus another modality: relevant to diagnosis  · Outcome monitoring methods: self-report, observation  · Therapeutic intervention type: insight oriented psychotherapy, supportive psychotherapy    Risk parameters:  Patient reports no suicidal ideation  Patient reports no homicidal ideation  Patient reports no self-injurious behavior  Patient reports no violent behavior    Verbal deficits: None    Patient's response to intervention:  The patient's response to intervention is accepting.    Progress toward goals and other mental status changes:  The patient's progress toward goals is fair .    Diagnosis:     ICD-10-CM ICD-9-CM   1. Bipolar I disorder, most recent episode depressed, moderate F31.32 296.52       Plan:  individual psychotherapy and medication management by physician    Return to clinic: as scheduled    Length of Service (minutes): 45    "
Admission

## 2022-03-17 LAB
ANION GAP SERPL CALC-SCNC: 5 MMOL/L — SIGNIFICANT CHANGE UP (ref 5–17)
BUN SERPL-MCNC: 8 MG/DL — SIGNIFICANT CHANGE UP (ref 7–18)
CALCIUM SERPL-MCNC: 8.8 MG/DL — SIGNIFICANT CHANGE UP (ref 8.4–10.5)
CHLORIDE SERPL-SCNC: 104 MMOL/L — SIGNIFICANT CHANGE UP (ref 96–108)
CO2 SERPL-SCNC: 28 MMOL/L — SIGNIFICANT CHANGE UP (ref 22–31)
CREAT SERPL-MCNC: 0.88 MG/DL — SIGNIFICANT CHANGE UP (ref 0.5–1.3)
EGFR: 113 ML/MIN/1.73M2 — SIGNIFICANT CHANGE UP
GLUCOSE SERPL-MCNC: 106 MG/DL — HIGH (ref 70–99)
HCT VFR BLD CALC: 39 % — SIGNIFICANT CHANGE UP (ref 39–50)
HGB BLD-MCNC: 12.5 G/DL — LOW (ref 13–17)
MAGNESIUM SERPL-MCNC: 2.3 MG/DL — SIGNIFICANT CHANGE UP (ref 1.6–2.6)
MCHC RBC-ENTMCNC: 27.7 PG — SIGNIFICANT CHANGE UP (ref 27–34)
MCHC RBC-ENTMCNC: 32.1 GM/DL — SIGNIFICANT CHANGE UP (ref 32–36)
MCV RBC AUTO: 86.3 FL — SIGNIFICANT CHANGE UP (ref 80–100)
NRBC # BLD: 0 /100 WBCS — SIGNIFICANT CHANGE UP (ref 0–0)
PHOSPHATE SERPL-MCNC: 1.9 MG/DL — LOW (ref 2.5–4.5)
PLATELET # BLD AUTO: 299 K/UL — SIGNIFICANT CHANGE UP (ref 150–400)
POTASSIUM SERPL-MCNC: 3.3 MMOL/L — LOW (ref 3.5–5.3)
POTASSIUM SERPL-SCNC: 3.3 MMOL/L — LOW (ref 3.5–5.3)
RBC # BLD: 4.52 M/UL — SIGNIFICANT CHANGE UP (ref 4.2–5.8)
RBC # FLD: 13.9 % — SIGNIFICANT CHANGE UP (ref 10.3–14.5)
SODIUM SERPL-SCNC: 137 MMOL/L — SIGNIFICANT CHANGE UP (ref 135–145)
WBC # BLD: 21.42 K/UL — HIGH (ref 3.8–10.5)
WBC # FLD AUTO: 21.42 K/UL — HIGH (ref 3.8–10.5)

## 2022-03-17 PROCEDURE — 74176 CT ABD & PELVIS W/O CONTRAST: CPT | Mod: 26

## 2022-03-17 PROCEDURE — 99232 SBSQ HOSP IP/OBS MODERATE 35: CPT

## 2022-03-17 RX ORDER — POTASSIUM PHOSPHATE, MONOBASIC POTASSIUM PHOSPHATE, DIBASIC 236; 224 MG/ML; MG/ML
15 INJECTION, SOLUTION INTRAVENOUS ONCE
Refills: 0 | Status: COMPLETED | OUTPATIENT
Start: 2022-03-17 | End: 2022-03-17

## 2022-03-17 RX ORDER — IOHEXOL 300 MG/ML
30 INJECTION, SOLUTION INTRAVENOUS ONCE
Refills: 0 | Status: COMPLETED | OUTPATIENT
Start: 2022-03-17 | End: 2022-03-17

## 2022-03-17 RX ADMIN — PIPERACILLIN AND TAZOBACTAM 25 GRAM(S): 4; .5 INJECTION, POWDER, LYOPHILIZED, FOR SOLUTION INTRAVENOUS at 05:14

## 2022-03-17 RX ADMIN — Medication 650 MILLIGRAM(S): at 16:48

## 2022-03-17 RX ADMIN — ONDANSETRON 4 MILLIGRAM(S): 8 TABLET, FILM COATED ORAL at 14:30

## 2022-03-17 RX ADMIN — POTASSIUM PHOSPHATE, MONOBASIC POTASSIUM PHOSPHATE, DIBASIC 62.5 MILLIMOLE(S): 236; 224 INJECTION, SOLUTION INTRAVENOUS at 19:01

## 2022-03-17 RX ADMIN — ENOXAPARIN SODIUM 40 MILLIGRAM(S): 100 INJECTION SUBCUTANEOUS at 11:53

## 2022-03-17 RX ADMIN — PIPERACILLIN AND TAZOBACTAM 25 GRAM(S): 4; .5 INJECTION, POWDER, LYOPHILIZED, FOR SOLUTION INTRAVENOUS at 15:40

## 2022-03-17 RX ADMIN — IOHEXOL 30 MILLILITER(S): 300 INJECTION, SOLUTION INTRAVENOUS at 10:38

## 2022-03-17 RX ADMIN — Medication 650 MILLIGRAM(S): at 17:37

## 2022-03-17 RX ADMIN — PIPERACILLIN AND TAZOBACTAM 25 GRAM(S): 4; .5 INJECTION, POWDER, LYOPHILIZED, FOR SOLUTION INTRAVENOUS at 21:21

## 2022-03-17 NOTE — PROGRESS NOTE ADULT - ASSESSMENT
Acute Perforated Diverticulitis  Focal peritonitis  Fevers  Leukocytosis - decreasing slowly    Plan - Cont Zosyn 3.375 gms iv q8hrs  Repeat CT abdomen and pelvis with oral contrast tomorrow. Acute Perforated Diverticulitis  Focal peritonitis  Fevers  Leukocytosis     Plan - Cont Zosyn 3.375 gms iv q8hrs  Plan to DC home on Saturday on Ceftin 500mgs po bid and Flagyl 500mgs po TID both for 10 days as long as WBC decreasing and not febrile.

## 2022-03-17 NOTE — PROGRESS NOTE ADULT - RS GEN PE MLT RESP DETAILS PC
breath sounds equal/clear to auscultation bilaterally/no rales/no rhonchi/no wheezes
breath sounds equal/good air movement/clear to auscultation bilaterally/no rales/no rhonchi/no wheezes

## 2022-03-17 NOTE — PROGRESS NOTE ADULT - SUBJECTIVE AND OBJECTIVE BOX
36y Male who is doing well clinically , his abdominal pain is much less, he had  a  fever yesterday and chills but his WBC count remains high. He is tolerating a liquid diet and had 2 liquid BM's. His repeat CT shows an increase in free air but no collection and decreased inflammation.    Meds:  piperacillin/tazobactam IVPB.. 3.375 Gram(s) IV Intermittent every 8 hours    Allergies    No Known Drug Allergies  Shrimp (Hives)    Intolerances        VITALS:  Vital Signs Last 24 Hrs  T(C): 38.1 (17 Mar 2022 16:46), Max: 39.3 (16 Mar 2022 18:46)  T(F): 100.6 (17 Mar 2022 16:46), Max: 102.8 (16 Mar 2022 18:46)  HR: 89 (17 Mar 2022 13:27) (89 - 103)  BP: 127/78 (17 Mar 2022 13:27) (118/78 - 127/78)  BP(mean): --  RR: 16 (17 Mar 2022 13:27) (16 - 18)  SpO2: 97% (17 Mar 2022 13:27) (95% - 97%)    LABS/DIAGNOSTIC TESTS:                          12.5   21.42 )-----------( 299      ( 17 Mar 2022 06:59 )             39.0         03-17    137  |  104  |  8   ----------------------------<  106<H>  3.3<L>   |  28  |  0.88    Ca    8.8      17 Mar 2022 06:59  Phos  1.9     03-17  Mg     2.3     03-17            CULTURES:       RADIOLOGY:< from: CT Abdomen and Pelvis w/ Oral Cont (03.17.22 @ 14:45) >  ACC: [id number] EXAM:  CT ABDOMEN AND PELVIS OC                          PROCEDURE DATE:  03/17/2022          INTERPRETATION:  CLINICAL INFORMATION: Follow-up evaluation of sigmoid   perforation.    COMPARISON: 03/14/2022.    CONTRAST/COMPLICATIONS:  IV Contrast: NONE  Oral Contrast: Omnipaque 300  Complications: None reported at time of study completion    PROCEDURE:  CT of the Abdomen and Pelvis was performed.  Sagittal and coronal reformats were performed.    FINDINGS:  LOWER CHEST: Subsegmental atelectasis and trace pleural fluid   predominantly on the left side.    LIVER: Steatosis.  BILE DUCTS: Normal caliber.  GALLBLADDER: Possible sludge.  SPLEEN: Within normal limits.  PANCREAS: Fatty infiltration  ADRENALS: Within normal limits.  KIDNEYS/URETERS: Asymmetric left perinephric stranding. Mild prominence   of the proximal and mid ureter to the level of sigmoid perforation. No   evidence of hydronephrosis at this time.    BLADDER: Minimally distended.  REPRODUCTIVE ORGANS: Prostate within normal limits.    BOWEL: No bowel obstruction. Appendix is normal. Redemonstration of   extraluminal gas, pericolonic haziness and stranding at the level of   proximal sigmoid. There has been interval increase in the amount of   extraluminal gasalthough pericolonic inflammatory change appears to have   improved. Linear fluid stranding is again seen, without evidence of any   drainable collection.  PERITONEUM: No ascites or pneumoperitoneum.  VESSELS: Within normal limits.  RETROPERITONEUM/LYMPH NODES: No lymphadenopathy.  ABDOMINAL WALL: Fat-containing umbilical hernia.  BONES: Within normal limits.    IMPRESSION:  Redemonstration of localized proximal sigmoid perforation as described   above.        --- End of Report ---            LION BRAGG MD; Attending Radiologist  This document has been electronically signed. Mar 17 2022  3:39PM    < end of copied text >        ROS:  [  ] UNABLE TO ELICIT

## 2022-03-17 NOTE — PROGRESS NOTE ADULT - ASSESSMENT
36M with left perforated sigmoid diverticulitis, likely contained    - continue clears, possibly advance later tonight  - monitor labs and vital signs  - continue IV antibiotics; ID recs appreciated  - pain control PRN   - discussed with Dr. Lee

## 2022-03-17 NOTE — PROGRESS NOTE ADULT - SUBJECTIVE AND OBJECTIVE BOX
INTERVAL HPI/OVERNIGHT EVENTS:  No acute events overnight. Pt reports he feels abdominal heat in the lower abdomen. Otherwise tolerating clear liquids without nausea/vomiting.     MEDICATIONS  (STANDING):  enoxaparin Injectable 40 milliGRAM(s) SubCutaneous every 24 hours  piperacillin/tazobactam IVPB.. 3.375 Gram(s) IV Intermittent every 8 hours  sodium chloride 0.9%. 1000 milliLiter(s) (150 mL/Hr) IV Continuous <Continuous>    MEDICATIONS  (PRN):  acetaminophen     Tablet .. 650 milliGRAM(s) Oral every 6 hours PRN Temp greater or equal to 38C (100.4F), Mild Pain (1 - 3)  HYDROmorphone  Injectable 0.5 milliGRAM(s) IV Push every 4 hours PRN Severe Pain (7 - 10)  ondansetron Injectable 4 milliGRAM(s) IV Push every 6 hours PRN Nausea      Vital Signs Last 24 Hrs  T(C): 37.9 (17 Mar 2022 04:35), Max: 39.3 (16 Mar 2022 18:46)  T(F): 100.2 (17 Mar 2022 04:35), Max: 102.8 (16 Mar 2022 18:46)  HR: 103 (17 Mar 2022 04:35) (96 - 103)  BP: 118/78 (17 Mar 2022 04:35) (118/78 - 124/75)  RR: 18 (17 Mar 2022 04:35) (16 - 18)  SpO2: 95% (17 Mar 2022 04:35) (95% - 100%)    Physical:  General: Alert and oriented, not in acute distress  Resp: Breathing unlabored  Abdomen: obese, soft, nondistended, nontender, no rebound, no guarding  : No lebron catheter, no dysuria or hematuria  Extremities: No pedal edema    LABS:                      12.5   21.42 )-----------( 299      ( 17 Mar 2022 06:59 )             39.0             03-17    137  |  104  |  8   ----------------------------<  106<H>  3.3<L>   |  28  |  0.88    Ca    8.8      17 Mar 2022 06:59  Phos  1.9     03-17  Mg     2.3     03-17

## 2022-03-18 LAB
ANION GAP SERPL CALC-SCNC: 6 MMOL/L — SIGNIFICANT CHANGE UP (ref 5–17)
BUN SERPL-MCNC: 6 MG/DL — LOW (ref 7–18)
CALCIUM SERPL-MCNC: 8.4 MG/DL — SIGNIFICANT CHANGE UP (ref 8.4–10.5)
CHLORIDE SERPL-SCNC: 103 MMOL/L — SIGNIFICANT CHANGE UP (ref 96–108)
CO2 SERPL-SCNC: 30 MMOL/L — SIGNIFICANT CHANGE UP (ref 22–31)
CREAT SERPL-MCNC: 0.81 MG/DL — SIGNIFICANT CHANGE UP (ref 0.5–1.3)
EGFR: 116 ML/MIN/1.73M2 — SIGNIFICANT CHANGE UP
GLUCOSE SERPL-MCNC: 108 MG/DL — HIGH (ref 70–99)
HCT VFR BLD CALC: 35.4 % — LOW (ref 39–50)
HGB BLD-MCNC: 11.3 G/DL — LOW (ref 13–17)
MCHC RBC-ENTMCNC: 27.2 PG — SIGNIFICANT CHANGE UP (ref 27–34)
MCHC RBC-ENTMCNC: 31.9 GM/DL — LOW (ref 32–36)
MCV RBC AUTO: 85.1 FL — SIGNIFICANT CHANGE UP (ref 80–100)
NRBC # BLD: 0 /100 WBCS — SIGNIFICANT CHANGE UP (ref 0–0)
PLATELET # BLD AUTO: 281 K/UL — SIGNIFICANT CHANGE UP (ref 150–400)
POTASSIUM SERPL-MCNC: 3.3 MMOL/L — LOW (ref 3.5–5.3)
POTASSIUM SERPL-SCNC: 3.3 MMOL/L — LOW (ref 3.5–5.3)
RBC # BLD: 4.16 M/UL — LOW (ref 4.2–5.8)
RBC # FLD: 13.9 % — SIGNIFICANT CHANGE UP (ref 10.3–14.5)
SODIUM SERPL-SCNC: 139 MMOL/L — SIGNIFICANT CHANGE UP (ref 135–145)
WBC # BLD: 16.34 K/UL — HIGH (ref 3.8–10.5)
WBC # FLD AUTO: 16.34 K/UL — HIGH (ref 3.8–10.5)

## 2022-03-18 PROCEDURE — 99232 SBSQ HOSP IP/OBS MODERATE 35: CPT

## 2022-03-18 RX ORDER — METRONIDAZOLE 500 MG
1 TABLET ORAL
Qty: 28 | Refills: 0
Start: 2022-03-18 | End: 2022-03-31

## 2022-03-18 RX ORDER — CEFUROXIME AXETIL 250 MG
1 TABLET ORAL
Qty: 28 | Refills: 0
Start: 2022-03-18 | End: 2022-03-31

## 2022-03-18 RX ORDER — POTASSIUM CHLORIDE 20 MEQ
10 PACKET (EA) ORAL
Refills: 0 | Status: COMPLETED | OUTPATIENT
Start: 2022-03-18 | End: 2022-03-18

## 2022-03-18 RX ADMIN — ENOXAPARIN SODIUM 40 MILLIGRAM(S): 100 INJECTION SUBCUTANEOUS at 11:32

## 2022-03-18 RX ADMIN — Medication 650 MILLIGRAM(S): at 05:19

## 2022-03-18 RX ADMIN — Medication 100 MILLIEQUIVALENT(S): at 11:33

## 2022-03-18 RX ADMIN — Medication 100 MILLIEQUIVALENT(S): at 11:19

## 2022-03-18 RX ADMIN — PIPERACILLIN AND TAZOBACTAM 25 GRAM(S): 4; .5 INJECTION, POWDER, LYOPHILIZED, FOR SOLUTION INTRAVENOUS at 13:01

## 2022-03-18 RX ADMIN — PIPERACILLIN AND TAZOBACTAM 25 GRAM(S): 4; .5 INJECTION, POWDER, LYOPHILIZED, FOR SOLUTION INTRAVENOUS at 21:10

## 2022-03-18 RX ADMIN — ONDANSETRON 4 MILLIGRAM(S): 8 TABLET, FILM COATED ORAL at 22:50

## 2022-03-18 RX ADMIN — Medication 650 MILLIGRAM(S): at 05:54

## 2022-03-18 RX ADMIN — Medication 100 MILLIEQUIVALENT(S): at 10:28

## 2022-03-18 RX ADMIN — PIPERACILLIN AND TAZOBACTAM 25 GRAM(S): 4; .5 INJECTION, POWDER, LYOPHILIZED, FOR SOLUTION INTRAVENOUS at 05:19

## 2022-03-18 NOTE — PROGRESS NOTE ADULT - ASSESSMENT
36M with left perforated sigmoid diverticulitis, likely contained  WBC downtrending, 16K   repeat CT reviewed with attending, no worsening     - continue clears  - monitor labs and vital signs  - continue IV antibiotics; ID recs appreciated  - pain control PRN   - discussed with Dr. Lee

## 2022-03-18 NOTE — PROGRESS NOTE ADULT - SUBJECTIVE AND OBJECTIVE BOX
INTERVAL HPI/OVERNIGHT EVENTS:  Patient seen and examined at bedside   Pt resting comfortably. No acute complaints.   Tolerating clear liquid diet.    Patient denies chest pain, shortness of breath, fever, chills.     MEDICATIONS  (STANDING):  enoxaparin Injectable 40 milliGRAM(s) SubCutaneous every 24 hours  piperacillin/tazobactam IVPB.. 3.375 Gram(s) IV Intermittent every 8 hours  sodium chloride 0.9%. 1000 milliLiter(s) (150 mL/Hr) IV Continuous <Continuous>    MEDICATIONS  (PRN):  acetaminophen     Tablet .. 650 milliGRAM(s) Oral every 6 hours PRN Temp greater or equal to 38C (100.4F), Mild Pain (1 - 3)  HYDROmorphone  Injectable 0.5 milliGRAM(s) IV Push every 4 hours PRN Severe Pain (7 - 10)  ondansetron Injectable 4 milliGRAM(s) IV Push every 6 hours PRN Nausea      Vital Signs Last 24 Hrs  T(C): 38.1 (18 Mar 2022 05:30), Max: 38.1 (17 Mar 2022 16:46)  T(F): 100.5 (18 Mar 2022 05:30), Max: 100.6 (17 Mar 2022 16:46)  HR: 87 (18 Mar 2022 05:30) (81 - 89)  BP: 131/74 (18 Mar 2022 05:30) (127/78 - 131/74)  RR: 18 (18 Mar 2022 05:30) (16 - 18)  SpO2: 94% (18 Mar 2022 05:30) (94% - 99%)    Physical:  General: A&Ox3. NAD.  Respiratory: non labored  Abdomen: Soft, nondistended, mild tenderness to deep palpation in the LLQ,      I&O's Detail      LABS:                        11.3   16.34 )-----------( 281      ( 18 Mar 2022 06:46 )             35.4             03-18    < from: CT Abdomen and Pelvis w/ Oral Cont (03.17.22 @ 14:45) >  CONTRAST/COMPLICATIONS:  IV Contrast: NONE  Oral Contrast: Omnipaque 300  Complications: None reported at time of study completion    PROCEDURE:  CT of the Abdomen and Pelvis was performed.  Sagittal and coronal reformats were performed.    FINDINGS:  LOWER CHEST: Subsegmental atelectasis and trace pleural fluid   predominantly on the left side.    LIVER: Steatosis.  BILE DUCTS: Normal caliber.  GALLBLADDER: Possible sludge.  SPLEEN: Within normal limits.  PANCREAS: Fatty infiltration  ADRENALS: Within normal limits.  KIDNEYS/URETERS: Asymmetric left perinephric stranding. Mild prominence   of the proximal and mid ureter to the level of sigmoid perforation. No   evidence of hydronephrosis at this time.    BLADDER: Minimally distended.  REPRODUCTIVE ORGANS: Prostate within normal limits.    BOWEL: No bowel obstruction. Appendix is normal. Redemonstration of   extraluminal gas, pericolonic haziness and stranding at the level of   proximal sigmoid. There has been interval increase in the amount of   extraluminal gasalthough pericolonic inflammatory change appears to have   improved. Linear fluid stranding is again seen, without evidence of any   drainable collection.  PERITONEUM: No ascites or pneumoperitoneum.  VESSELS: Within normal limits.  RETROPERITONEUM/LYMPH NODES: No lymphadenopathy.  ABDOMINAL WALL: Fat-containing umbilical hernia.  BONES: Within normal limits.    IMPRESSION:  Redemonstration of localized proximal sigmoid perforation as described   above.    < end of copied text >      < from: CT Abdomen and Pelvis w/ Oral Cont (03.17.22 @ 14:45) >  PROCEDURE:  CT of the Abdomen and Pelvis was performed.  Sagittal and coronal reformats were performed.    FINDINGS:  LOWER CHEST: Subsegmental atelectasis and trace pleural fluid   predominantly on the left side.    LIVER: Steatosis.  BILE DUCTS: Normal caliber.  GALLBLADDER: Possible sludge.  SPLEEN: Within normal limits.  PANCREAS: Fatty infiltration  ADRENALS: Within normal limits.  KIDNEYS/URETERS: Asymmetric left perinephric stranding. Mild prominence   of the proximal and mid ureter to the level of sigmoid perforation. No   evidence of hydronephrosis at this time.    BLADDER: Minimally distended.  REPRODUCTIVE ORGANS: Prostate within normal limits.    BOWEL: No bowel obstruction. Appendix is normal. Redemonstration of   extraluminal gas, pericolonic haziness and stranding at the level of   proximal sigmoid. There has been interval increase in the amount of   extraluminal gasalthough pericolonic inflammatory change appears to have   improved. Linear fluid stranding is again seen, without evidence of any   drainable collection.  PERITONEUM: No ascites or pneumoperitoneum.  VESSELS: Within normal limits.  RETROPERITONEUM/LYMPH NODES: No lymphadenopathy.  ABDOMINAL WALL: Fat-containing umbilical hernia.  BONES: Within normal limits.    IMPRESSION:  Redemonstration of localized proximal sigmoid perforation as described   above.    < end of copied text >  139  |  103  |  6<L>  ----------------------------<  108<H>  3.3<L>   |  30  |  0.81    Ca    8.4      18 Mar 2022 06:46  Phos  1.9     03-17  Mg     2.3     03-17

## 2022-03-19 LAB
ANION GAP SERPL CALC-SCNC: 5 MMOL/L — SIGNIFICANT CHANGE UP (ref 5–17)
BASOPHILS # BLD AUTO: 0 K/UL — SIGNIFICANT CHANGE UP (ref 0–0.2)
BASOPHILS NFR BLD AUTO: 0 % — SIGNIFICANT CHANGE UP (ref 0–2)
BUN SERPL-MCNC: 6 MG/DL — LOW (ref 7–18)
CALCIUM SERPL-MCNC: 8.9 MG/DL — SIGNIFICANT CHANGE UP (ref 8.4–10.5)
CHLORIDE SERPL-SCNC: 104 MMOL/L — SIGNIFICANT CHANGE UP (ref 96–108)
CO2 SERPL-SCNC: 30 MMOL/L — SIGNIFICANT CHANGE UP (ref 22–31)
CREAT SERPL-MCNC: 0.77 MG/DL — SIGNIFICANT CHANGE UP (ref 0.5–1.3)
EGFR: 118 ML/MIN/1.73M2 — SIGNIFICANT CHANGE UP
EOSINOPHIL # BLD AUTO: 0.16 K/UL — SIGNIFICANT CHANGE UP (ref 0–0.5)
EOSINOPHIL NFR BLD AUTO: 1 % — SIGNIFICANT CHANGE UP (ref 0–6)
GLUCOSE SERPL-MCNC: 103 MG/DL — HIGH (ref 70–99)
HCT VFR BLD CALC: 36 % — LOW (ref 39–50)
HGB BLD-MCNC: 11.8 G/DL — LOW (ref 13–17)
HYPOCHROMIA BLD QL: SLIGHT — SIGNIFICANT CHANGE UP
LYMPHOCYTES # BLD AUTO: 16 % — SIGNIFICANT CHANGE UP (ref 13–44)
LYMPHOCYTES # BLD AUTO: 2.6 K/UL — SIGNIFICANT CHANGE UP (ref 1–3.3)
MAGNESIUM SERPL-MCNC: 2 MG/DL — SIGNIFICANT CHANGE UP (ref 1.6–2.6)
MANUAL SMEAR VERIFICATION: SIGNIFICANT CHANGE UP
MCHC RBC-ENTMCNC: 27.5 PG — SIGNIFICANT CHANGE UP (ref 27–34)
MCHC RBC-ENTMCNC: 32.8 GM/DL — SIGNIFICANT CHANGE UP (ref 32–36)
MCV RBC AUTO: 83.9 FL — SIGNIFICANT CHANGE UP (ref 80–100)
MONOCYTES # BLD AUTO: 1.95 K/UL — HIGH (ref 0–0.9)
MONOCYTES NFR BLD AUTO: 12 % — SIGNIFICANT CHANGE UP (ref 2–14)
NEUTROPHILS # BLD AUTO: 11.23 K/UL — HIGH (ref 1.8–7.4)
NEUTROPHILS NFR BLD AUTO: 68 % — SIGNIFICANT CHANGE UP (ref 43–77)
NEUTS BAND # BLD: 1 % — SIGNIFICANT CHANGE UP (ref 0–8)
NRBC # BLD: 0 /100 — SIGNIFICANT CHANGE UP (ref 0–0)
PHOSPHATE SERPL-MCNC: 3.8 MG/DL — SIGNIFICANT CHANGE UP (ref 2.5–4.5)
PLAT MORPH BLD: NORMAL — SIGNIFICANT CHANGE UP
PLATELET # BLD AUTO: 326 K/UL — SIGNIFICANT CHANGE UP (ref 150–400)
PLATELET COUNT - ESTIMATE: NORMAL — SIGNIFICANT CHANGE UP
POTASSIUM SERPL-MCNC: 3.3 MMOL/L — LOW (ref 3.5–5.3)
POTASSIUM SERPL-SCNC: 3.3 MMOL/L — LOW (ref 3.5–5.3)
RBC # BLD: 4.29 M/UL — SIGNIFICANT CHANGE UP (ref 4.2–5.8)
RBC # FLD: 14.2 % — SIGNIFICANT CHANGE UP (ref 10.3–14.5)
RBC BLD AUTO: ABNORMAL
SODIUM SERPL-SCNC: 139 MMOL/L — SIGNIFICANT CHANGE UP (ref 135–145)
VARIANT LYMPHS # BLD: 2 % — SIGNIFICANT CHANGE UP (ref 0–6)
WBC # BLD: 16.27 K/UL — HIGH (ref 3.8–10.5)
WBC # FLD AUTO: 16.27 K/UL — HIGH (ref 3.8–10.5)

## 2022-03-19 RX ORDER — POTASSIUM CHLORIDE 20 MEQ
20 PACKET (EA) ORAL
Refills: 0 | Status: COMPLETED | OUTPATIENT
Start: 2022-03-19 | End: 2022-03-19

## 2022-03-19 RX ORDER — ACETAMINOPHEN 500 MG
1000 TABLET ORAL ONCE
Refills: 0 | Status: DISCONTINUED | OUTPATIENT
Start: 2022-03-19 | End: 2022-03-21

## 2022-03-19 RX ADMIN — Medication 20 MILLIEQUIVALENT(S): at 15:10

## 2022-03-19 RX ADMIN — PIPERACILLIN AND TAZOBACTAM 25 GRAM(S): 4; .5 INJECTION, POWDER, LYOPHILIZED, FOR SOLUTION INTRAVENOUS at 21:16

## 2022-03-19 RX ADMIN — ENOXAPARIN SODIUM 40 MILLIGRAM(S): 100 INJECTION SUBCUTANEOUS at 10:03

## 2022-03-19 RX ADMIN — PIPERACILLIN AND TAZOBACTAM 25 GRAM(S): 4; .5 INJECTION, POWDER, LYOPHILIZED, FOR SOLUTION INTRAVENOUS at 14:10

## 2022-03-19 RX ADMIN — ONDANSETRON 4 MILLIGRAM(S): 8 TABLET, FILM COATED ORAL at 17:09

## 2022-03-19 RX ADMIN — Medication 650 MILLIGRAM(S): at 10:04

## 2022-03-19 RX ADMIN — PIPERACILLIN AND TAZOBACTAM 25 GRAM(S): 4; .5 INJECTION, POWDER, LYOPHILIZED, FOR SOLUTION INTRAVENOUS at 05:01

## 2022-03-19 RX ADMIN — Medication 20 MILLIEQUIVALENT(S): at 12:24

## 2022-03-19 RX ADMIN — Medication 20 MILLIEQUIVALENT(S): at 11:24

## 2022-03-19 RX ADMIN — Medication 650 MILLIGRAM(S): at 11:00

## 2022-03-19 NOTE — PROGRESS NOTE ADULT - SUBJECTIVE AND OBJECTIVE BOX
Pt feels better, bm, no n/v  Vital Signs Last 24 Hrs  T(C): 37.1 (19 Mar 2022 05:10), Max: 37.8 (18 Mar 2022 22:19)  T(F): 98.7 (19 Mar 2022 05:10), Max: 100.1 (18 Mar 2022 22:19)  HR: 82 (19 Mar 2022 05:10) (82 - 92)  BP: 116/72 (19 Mar 2022 05:10) (116/72 - 131/85)  BP(mean): --  RR: 18 (19 Mar 2022 05:10) (16 - 18)  SpO2: 97% (19 Mar 2022 05:10) (97% - 98%)  Alert nad  Abd: obese, soft nt nd                        11.8   16.27 )-----------( 326      ( 19 Mar 2022 06:17 )             36.0   03-19    139  |  104  |  6<L>  ----------------------------<  103<H>  3.3<L>   |  30  |  0.77    Ca    8.9      19 Mar 2022 06:17  Phos  3.8     03-19  Mg     2.0     03-19

## 2022-03-19 NOTE — PROGRESS NOTE ADULT - ASSESSMENT
perforated sigmoid diverticulitis  clinically improved on abx and bowel rest  mild hypokalemia  persistent leukocytosis, on abx    observation  trend cbc  repleate k+  likely d/c home tomorrow on abx if leukocytosis trends down

## 2022-03-20 ENCOUNTER — TRANSCRIPTION ENCOUNTER (OUTPATIENT)
Age: 37
End: 2022-03-20

## 2022-03-20 LAB
ANION GAP SERPL CALC-SCNC: 6 MMOL/L — SIGNIFICANT CHANGE UP (ref 5–17)
BUN SERPL-MCNC: 6 MG/DL — LOW (ref 7–18)
CALCIUM SERPL-MCNC: 9.4 MG/DL — SIGNIFICANT CHANGE UP (ref 8.4–10.5)
CHLORIDE SERPL-SCNC: 103 MMOL/L — SIGNIFICANT CHANGE UP (ref 96–108)
CO2 SERPL-SCNC: 29 MMOL/L — SIGNIFICANT CHANGE UP (ref 22–31)
CREAT SERPL-MCNC: 0.85 MG/DL — SIGNIFICANT CHANGE UP (ref 0.5–1.3)
EGFR: 114 ML/MIN/1.73M2 — SIGNIFICANT CHANGE UP
GLUCOSE SERPL-MCNC: 102 MG/DL — HIGH (ref 70–99)
HCT VFR BLD CALC: 37.5 % — LOW (ref 39–50)
HGB BLD-MCNC: 12.2 G/DL — LOW (ref 13–17)
MAGNESIUM SERPL-MCNC: 2.1 MG/DL — SIGNIFICANT CHANGE UP (ref 1.6–2.6)
MCHC RBC-ENTMCNC: 27.6 PG — SIGNIFICANT CHANGE UP (ref 27–34)
MCHC RBC-ENTMCNC: 32.5 GM/DL — SIGNIFICANT CHANGE UP (ref 32–36)
MCV RBC AUTO: 84.8 FL — SIGNIFICANT CHANGE UP (ref 80–100)
NRBC # BLD: 0 /100 WBCS — SIGNIFICANT CHANGE UP (ref 0–0)
PHOSPHATE SERPL-MCNC: 4.5 MG/DL — SIGNIFICANT CHANGE UP (ref 2.5–4.5)
PLATELET # BLD AUTO: 334 K/UL — SIGNIFICANT CHANGE UP (ref 150–400)
POTASSIUM SERPL-MCNC: 3.6 MMOL/L — SIGNIFICANT CHANGE UP (ref 3.5–5.3)
POTASSIUM SERPL-SCNC: 3.6 MMOL/L — SIGNIFICANT CHANGE UP (ref 3.5–5.3)
RBC # BLD: 4.42 M/UL — SIGNIFICANT CHANGE UP (ref 4.2–5.8)
RBC # FLD: 14.6 % — HIGH (ref 10.3–14.5)
SODIUM SERPL-SCNC: 138 MMOL/L — SIGNIFICANT CHANGE UP (ref 135–145)
WBC # BLD: 15.54 K/UL — HIGH (ref 3.8–10.5)
WBC # FLD AUTO: 15.54 K/UL — HIGH (ref 3.8–10.5)

## 2022-03-20 RX ADMIN — PIPERACILLIN AND TAZOBACTAM 25 GRAM(S): 4; .5 INJECTION, POWDER, LYOPHILIZED, FOR SOLUTION INTRAVENOUS at 21:49

## 2022-03-20 RX ADMIN — ENOXAPARIN SODIUM 40 MILLIGRAM(S): 100 INJECTION SUBCUTANEOUS at 11:23

## 2022-03-20 RX ADMIN — PIPERACILLIN AND TAZOBACTAM 25 GRAM(S): 4; .5 INJECTION, POWDER, LYOPHILIZED, FOR SOLUTION INTRAVENOUS at 13:13

## 2022-03-20 RX ADMIN — PIPERACILLIN AND TAZOBACTAM 25 GRAM(S): 4; .5 INJECTION, POWDER, LYOPHILIZED, FOR SOLUTION INTRAVENOUS at 05:57

## 2022-03-20 NOTE — DISCHARGE NOTE NURSING/CASE MANAGEMENT/SOCIAL WORK - NSDCPEFALRISK_GEN_ALL_CORE
For information on Fall & Injury Prevention, visit: [email or web address] OR  [email or web address] OR  https://www.cdc.gov/steadi/patient.html

## 2022-03-20 NOTE — PROGRESS NOTE ADULT - ASSESSMENT
resolving sigmoid divertidulitis  leukocytosis with trend downward  afebrile and no abdominal pain    d/c today with po abx and Surgery follow up

## 2022-03-20 NOTE — DISCHARGE NOTE NURSING/CASE MANAGEMENT/SOCIAL WORK - PATIENT PORTAL LINK FT
You can access the FollowMyHealth Patient Portal offered by [facility] by registering at the following website: [email or web address]. By joining [facility]’s FollowMyHealth portal, you will also be able to view your health information using other applications (apps) compatible with our system.

## 2022-03-20 NOTE — PROGRESS NOTE ADULT - SUBJECTIVE AND OBJECTIVE BOX
Pt states he feels very good. tolerating regular diet  Vital Signs Last 24 Hrs  T(C): 36.8 (20 Mar 2022 14:01), Max: 37.4 (19 Mar 2022 19:01)  T(F): 98.2 (20 Mar 2022 14:01), Max: 99.3 (19 Mar 2022 19:01)  HR: 73 (20 Mar 2022 14:01) (62 - 73)  BP: 113/75 (20 Mar 2022 14:01) (113/75 - 139/77)  BP(mean): 85 (20 Mar 2022 06:05) (82 - 85)  RR: 16 (20 Mar 2022 14:01) (16 - 18)  SpO2: 99% (20 Mar 2022 14:01) (97% - 99%)  Alert nad  Abd: soft nt nd no cvat                        12.2   15.54 )-----------( 334      ( 20 Mar 2022 06:00 )             37.5   03-20    138  |  103  |  6<L>  ----------------------------<  102<H>  3.6   |  29  |  0.85    Ca    9.4      20 Mar 2022 06:00  Phos  4.5     03-20  Mg     2.1     03-20

## 2022-03-21 ENCOUNTER — TRANSCRIPTION ENCOUNTER (OUTPATIENT)
Age: 37
End: 2022-03-21

## 2022-03-21 VITALS
HEART RATE: 73 BPM | TEMPERATURE: 98 F | SYSTOLIC BLOOD PRESSURE: 115 MMHG | OXYGEN SATURATION: 97 % | DIASTOLIC BLOOD PRESSURE: 80 MMHG | RESPIRATION RATE: 18 BRPM

## 2022-03-21 LAB
HCT VFR BLD CALC: 38.6 % — LOW (ref 39–50)
HGB BLD-MCNC: 12.5 G/DL — LOW (ref 13–17)
MCHC RBC-ENTMCNC: 27.4 PG — SIGNIFICANT CHANGE UP (ref 27–34)
MCHC RBC-ENTMCNC: 32.4 GM/DL — SIGNIFICANT CHANGE UP (ref 32–36)
MCV RBC AUTO: 84.6 FL — SIGNIFICANT CHANGE UP (ref 80–100)
NRBC # BLD: 0 /100 WBCS — SIGNIFICANT CHANGE UP (ref 0–0)
PLATELET # BLD AUTO: 358 K/UL — SIGNIFICANT CHANGE UP (ref 150–400)
RBC # BLD: 4.56 M/UL — SIGNIFICANT CHANGE UP (ref 4.2–5.8)
RBC # FLD: 14.5 % — SIGNIFICANT CHANGE UP (ref 10.3–14.5)
SARS-COV-2 RNA SPEC QL NAA+PROBE: SIGNIFICANT CHANGE UP
WBC # BLD: 12.89 K/UL — HIGH (ref 3.8–10.5)
WBC # FLD AUTO: 12.89 K/UL — HIGH (ref 3.8–10.5)

## 2022-03-21 PROCEDURE — 83735 ASSAY OF MAGNESIUM: CPT

## 2022-03-21 PROCEDURE — G1004: CPT

## 2022-03-21 PROCEDURE — 36415 COLL VENOUS BLD VENIPUNCTURE: CPT

## 2022-03-21 PROCEDURE — 83690 ASSAY OF LIPASE: CPT

## 2022-03-21 PROCEDURE — 84100 ASSAY OF PHOSPHORUS: CPT

## 2022-03-21 PROCEDURE — 74176 CT ABD & PELVIS W/O CONTRAST: CPT

## 2022-03-21 PROCEDURE — 96374 THER/PROPH/DIAG INJ IV PUSH: CPT

## 2022-03-21 PROCEDURE — 96375 TX/PRO/DX INJ NEW DRUG ADDON: CPT

## 2022-03-21 PROCEDURE — 99238 HOSP IP/OBS DSCHRG MGMT 30/<: CPT

## 2022-03-21 PROCEDURE — 74177 CT ABD & PELVIS W/CONTRAST: CPT | Mod: ME

## 2022-03-21 PROCEDURE — 99285 EMERGENCY DEPT VISIT HI MDM: CPT

## 2022-03-21 PROCEDURE — 85027 COMPLETE CBC AUTOMATED: CPT

## 2022-03-21 PROCEDURE — 85025 COMPLETE CBC W/AUTO DIFF WBC: CPT

## 2022-03-21 PROCEDURE — 80048 BASIC METABOLIC PNL TOTAL CA: CPT

## 2022-03-21 PROCEDURE — 80053 COMPREHEN METABOLIC PANEL: CPT

## 2022-03-21 PROCEDURE — 87635 SARS-COV-2 COVID-19 AMP PRB: CPT

## 2022-03-21 RX ADMIN — PIPERACILLIN AND TAZOBACTAM 25 GRAM(S): 4; .5 INJECTION, POWDER, LYOPHILIZED, FOR SOLUTION INTRAVENOUS at 13:12

## 2022-03-21 RX ADMIN — PIPERACILLIN AND TAZOBACTAM 25 GRAM(S): 4; .5 INJECTION, POWDER, LYOPHILIZED, FOR SOLUTION INTRAVENOUS at 05:49

## 2022-03-21 RX ADMIN — ENOXAPARIN SODIUM 40 MILLIGRAM(S): 100 INJECTION SUBCUTANEOUS at 11:41

## 2022-03-21 NOTE — PROGRESS NOTE ADULT - SUBJECTIVE AND OBJECTIVE BOX
INTERVAL HPI/OVERNIGHT EVENTS:  Patient seen and examined at bedside   Pt resting comfortably. No acute complaints.   Tolerating regular diet. Denies N/V. Denies abdominal pain.   Patient denies chest pain, shortness of breath, fever, chills.       MEDICATIONS  (STANDING):  acetaminophen   IVPB .. 1000 milliGRAM(s) IV Intermittent once  enoxaparin Injectable 40 milliGRAM(s) SubCutaneous every 24 hours  piperacillin/tazobactam IVPB.. 3.375 Gram(s) IV Intermittent every 8 hours    MEDICATIONS  (PRN):  acetaminophen     Tablet .. 650 milliGRAM(s) Oral every 6 hours PRN Temp greater or equal to 38C (100.4F), Mild Pain (1 - 3)  HYDROmorphone  Injectable 0.5 milliGRAM(s) IV Push every 4 hours PRN Severe Pain (7 - 10)  ondansetron Injectable 4 milliGRAM(s) IV Push every 6 hours PRN Nausea      Vital Signs Last 24 Hrs  T(C): 36.8 (21 Mar 2022 05:44), Max: 37 (20 Mar 2022 20:59)  T(F): 98.2 (21 Mar 2022 05:44), Max: 98.6 (20 Mar 2022 20:59)  HR: 65 (21 Mar 2022 05:44) (65 - 76)  BP: 115/65 (21 Mar 2022 05:44) (113/75 - 144/80)  RR: 16 (21 Mar 2022 05:44) (16 - 16)  SpO2: 97% (21 Mar 2022 05:44) (97% - 99%)    Physical:  General: A&Ox3. NAD.  Respiratory: non labored  Skin: warm and moist   Abdomen: Soft, nondistended, nontender,  Extremities: non edematous, no calf pain bilaterally      LABS:                        12.2   15.54 )-----------( 334      ( 20 Mar 2022 06:00 )             37.5             03-20    138  |  103  |  6<L>  ----------------------------<  102<H>  3.6   |  29  |  0.85    Ca    9.4      20 Mar 2022 06:00  Phos  4.5     03-20  Mg     2.1     03-20

## 2022-03-21 NOTE — PROGRESS NOTE ADULT - ASSESSMENT
36M with left perforated sigmoid diverticulitis, likely contained  WBC downtrending  afebrile, no abdominal pain     - regular diet  - IV abx, plan for outpatient po abx, as per ID recs  - plan for outpatient surgery follow up   - plan for d/c today if tolerating diet and vitals wnl   - Pain control, Anti emetic, Anti pyretic PRN   - DVT ppx, ambulate as tolerated, OOB   - discussed and confirmed with Dr. Lee        36M with left perforated sigmoid diverticulitis, likely contained  WBC downtrending  afebrile, no abdominal pain     - regular diet  - f/u am labs  - IV abx, plan for outpatient po abx, as per ID recs  - plan for outpatient surgery follow up   - plan for d/c today if tolerating diet and vitals wnl   - Pain control, Anti emetic, Anti pyretic PRN   - DVT ppx, ambulate as tolerated, OOB   - discussed and confirmed with Dr. Lee

## 2022-03-21 NOTE — PROGRESS NOTE ADULT - REASON FOR ADMISSION
perforated sigmoid colon

## 2022-03-23 PROBLEM — Z00.00 ENCOUNTER FOR PREVENTIVE HEALTH EXAMINATION: Status: ACTIVE | Noted: 2022-03-23

## 2022-03-24 ENCOUNTER — APPOINTMENT (OUTPATIENT)
Dept: SURGERY | Facility: CLINIC | Age: 37
End: 2022-03-24
Payer: MEDICAID

## 2022-03-24 VITALS
DIASTOLIC BLOOD PRESSURE: 73 MMHG | HEART RATE: 80 BPM | WEIGHT: 300 LBS | SYSTOLIC BLOOD PRESSURE: 107 MMHG | BODY MASS INDEX: 47.09 KG/M2 | HEIGHT: 67 IN

## 2022-03-24 VITALS — TEMPERATURE: 96.4 F

## 2022-03-24 DIAGNOSIS — Z82.49 FAMILY HISTORY OF ISCHEMIC HEART DISEASE AND OTHER DISEASES OF THE CIRCULATORY SYSTEM: ICD-10-CM

## 2022-03-24 DIAGNOSIS — Z78.9 OTHER SPECIFIED HEALTH STATUS: ICD-10-CM

## 2022-03-24 DIAGNOSIS — Z80.42 FAMILY HISTORY OF MALIGNANT NEOPLASM OF PROSTATE: ICD-10-CM

## 2022-03-24 PROCEDURE — 99213 OFFICE O/P EST LOW 20 MIN: CPT

## 2022-03-24 RX ORDER — CEFUROXIME AXETIL 500 MG/1
500 TABLET ORAL
Refills: 0 | Status: ACTIVE | COMMUNITY

## 2022-03-24 RX ORDER — CEPHALEXIN 500 MG/1
TABLET ORAL
Refills: 0 | Status: ACTIVE | COMMUNITY

## 2022-03-24 RX ORDER — METRONIDAZOLE 500 MG/1
500 TABLET ORAL
Refills: 0 | Status: ACTIVE | COMMUNITY

## 2022-03-24 NOTE — PHYSICAL EXAM
[Normal Breath Sounds] : Normal breath sounds [Normal Rate and Rhythm] : normal rate and rhythm [No Rash or Lesion] : No rash or lesion [Alert] : alert [Oriented to Person] : oriented to person [Oriented to Place] : oriented to place [Oriented to Time] : oriented to time [Calm] : calm [de-identified] : A/Ox3; NAD. appears comfortable [de-identified] : EOMI; sclera anicteric. Nasal mucosa pink, septum midline. Oral mucosa pink. Tongue midline, Pharynx without exudates. [de-identified] : Abd is soft, nondistended, no rebound or guarding. No abdominal masses. No abdominal tenderness. [de-identified] : +ROM, no joint swelling

## 2022-03-24 NOTE — PLAN
[FreeTextEntry1] : recommended F/U CT scan in 1 month \par colonoscopy in 2 months and follow up with GI \par \par F/U, PRN \par \par Patient's questions and concerns addressed to their satisfaction, and patient verbalized an understanding of the information discussed.\par

## 2022-03-24 NOTE — HISTORY OF PRESENT ILLNESS
[de-identified] : Mr. PAN is a 36 year y/o M w PMHX prediabetes, recently admitted to [facility] with LLQ abdominal pain c/w diverticulitis, 03/14- 03/20/22. Pt underwent a CT scan that showed marked wall thickening of the proximal sigmoid colon with significant surrounding edema, localized adjacent extraluminal gas and subcentimeter lymph nodes. Findings are compatible with perforated colitis; underlying tumor cannot be excluded. No definite colonic diverticulosis is evident.\par He admits to continuing with PO ABX. Today, he is without reported complaints, stating he is feeling better. Denies abdominal pain. No nausea/vomiting.

## 2022-03-24 NOTE — ASSESSMENT
[FreeTextEntry1] : Mr. PAN is a 36 year y/o M who presents for follow up visit from the hospital, admitted for diverticulitis, and improved with antibiotics. Today, he is without reported complaints. No fevers/chills. No abdominal pain.

## 2022-04-21 ENCOUNTER — RESULT REVIEW (OUTPATIENT)
Age: 37
End: 2022-04-21

## 2022-04-21 ENCOUNTER — OUTPATIENT (OUTPATIENT)
Dept: OUTPATIENT SERVICES | Facility: HOSPITAL | Age: 37
LOS: 1 days | End: 2022-04-21
Payer: MEDICAID

## 2022-04-21 ENCOUNTER — APPOINTMENT (OUTPATIENT)
Dept: CT IMAGING | Facility: HOSPITAL | Age: 37
End: 2022-04-21
Payer: MEDICAID

## 2022-04-21 DIAGNOSIS — K57.32 DIVERTICULITIS OF LARGE INTESTINE WITHOUT PERFORATION OR ABSCESS WITHOUT BLEEDING: ICD-10-CM

## 2022-04-21 PROCEDURE — 74177 CT ABD & PELVIS W/CONTRAST: CPT

## 2022-04-21 PROCEDURE — 74177 CT ABD & PELVIS W/CONTRAST: CPT | Mod: 26

## 2022-05-12 ENCOUNTER — APPOINTMENT (OUTPATIENT)
Dept: SURGERY | Facility: CLINIC | Age: 37
End: 2022-05-12
Payer: MEDICAID

## 2022-05-12 VITALS
DIASTOLIC BLOOD PRESSURE: 77 MMHG | WEIGHT: 299 LBS | HEART RATE: 61 BPM | BODY MASS INDEX: 46.93 KG/M2 | SYSTOLIC BLOOD PRESSURE: 119 MMHG | HEIGHT: 67 IN

## 2022-05-12 VITALS — TEMPERATURE: 96.6 F

## 2022-05-12 PROCEDURE — 99213 OFFICE O/P EST LOW 20 MIN: CPT

## 2022-05-12 NOTE — ASSESSMENT
[FreeTextEntry1] : Mr. PAN is a 36 year y/o M w PMHX prediabetes, recently admitted to [facility] with LLQ abdominal pain c/w diverticulitis, 03/14- 03/20/22. Recent imaging from 04/21/22 reviewed. Patient states he's been feeling better and without reported complaints. No abdominal pain. No fevers/chills. He has normal BM's. Normal appetite.

## 2022-05-12 NOTE — PLAN
[FreeTextEntry1] : will monitor observe/for any changes or worsening symptoms \par patient plans to make appointment with GI \par F/U CT scan in 6 weeks \par \par Patient's questions and concerns addressed to their satisfaction, and patient verbalized an understanding of the information discussed.\par

## 2022-05-12 NOTE — HISTORY OF PRESENT ILLNESS
[de-identified] : Mr. PAN is a 36 year y/o M w PMHX prediabetes, recently admitted to [facility] with LLQ abdominal pain c/w diverticulitis, 03/14- 03/20/22.\par He presents to the office today for 1 month follow up visit, and recently had a follow up CT scan, 04/21/22. Patient states he's been feeling better and without reported complaints. No abdominal pain. No fevers/chills. No nausea/vomiting. He has normal BM's. Normal appetite.

## 2022-05-12 NOTE — PHYSICAL EXAM
[No Rash or Lesion] : No rash or lesion [Alert] : alert [Oriented to Person] : oriented to person [Oriented to Place] : oriented to place [Oriented to Time] : oriented to time [Calm] : calm [de-identified] : A/Ox3; NAD. appears comfortable [de-identified] : EOMI; sclera anicteric.  [de-identified] : airway patent [de-identified] : normal HR [de-identified] : Abd is soft, nondistended, no rebound or guarding. No abdominal masses. No abdominal tenderness. [de-identified] : +ROM, normal gait

## 2022-05-23 ENCOUNTER — APPOINTMENT (OUTPATIENT)
Dept: GASTROENTEROLOGY | Facility: CLINIC | Age: 37
End: 2022-05-23
Payer: MEDICAID

## 2022-05-23 VITALS
OXYGEN SATURATION: 98 % | WEIGHT: 298 LBS | HEIGHT: 67 IN | SYSTOLIC BLOOD PRESSURE: 105 MMHG | BODY MASS INDEX: 46.77 KG/M2 | DIASTOLIC BLOOD PRESSURE: 73 MMHG | HEART RATE: 62 BPM

## 2022-05-23 DIAGNOSIS — Z01.818 ENCOUNTER FOR OTHER PREPROCEDURAL EXAMINATION: ICD-10-CM

## 2022-05-23 DIAGNOSIS — R73.03 PREDIABETES.: ICD-10-CM

## 2022-05-23 DIAGNOSIS — K57.32 DIVERTICULITIS OF LARGE INTESTINE W/OUT PERFORATION OR ABSCESS W/OUT BLEEDING: ICD-10-CM

## 2022-05-23 PROCEDURE — 99204 OFFICE O/P NEW MOD 45 MIN: CPT

## 2022-05-23 RX ORDER — POLYETHYLENE GLYCOL 3350 AND ELECTROLYTES WITH LEMON FLAVOR 236; 22.74; 6.74; 5.86; 2.97 G/4L; G/4L; G/4L; G/4L; G/4L
236 POWDER, FOR SOLUTION ORAL
Qty: 1 | Refills: 0 | Status: ACTIVE | COMMUNITY
Start: 2022-05-23 | End: 1900-01-01

## 2022-05-23 NOTE — HISTORY OF PRESENT ILLNESS
[de-identified] : 36M with pmhx of prediabetes presenting for evaluation of diverticulitis. Pt hospitalized in 3/2022 initially with diverticulitis with microperforation. Treated with abx and improved. Then had CT 4/2022 showing residual inflammation. Has repeat CT upcoming 6/27. Pt reports feeling well. Denies any complaints today. Notes abd pain completely resolved. Notes similar episode in 2018 as well but self-treated with abx. Has never had a colonoscopy. Denies any abd pain, abd tenderness, n/v/d/c, melena, hematochezia, unintentional wt loss, or other issue. \par \par PMHx: Above.\par Medications: None.\par Allergies: NKA.\par Surgical Hx: Denies.\par SH: Denies tobacco or drug use. Social etoh.\par FH: Denies fhx of colon cancer, IBD, colitis, or crohn's.

## 2022-05-23 NOTE — ASSESSMENT
[FreeTextEntry1] : 36M with pmhx of prediabetes presenting for evaluation of diverticulitis. Pt hospitalized in 3/2022 initially with diverticulitis with microperforation. Treated with abx and improved. Then had CT 4/2022 showing residual inflammation. Has repeat CT upcoming 6/27. Pt reports feeling well. Denies any complaints today. Notes abd pain completely resolved. Notes similar episode in 2018 as well but self-treated with abx. Has never had a colonoscopy. \par 1. Diverticulitis: Doing much better. Recent CT with residual inflammation, has repeat scheduled 6/27.\par - Schedule colonoscopy to rule out other etiologies, schedule after next CT given residual inflammation.\par - COVID testing prior.\par - Bowel prep instructions given.

## 2022-06-27 ENCOUNTER — APPOINTMENT (OUTPATIENT)
Dept: CT IMAGING | Facility: HOSPITAL | Age: 37
End: 2022-06-27

## 2022-06-27 ENCOUNTER — RESULT REVIEW (OUTPATIENT)
Age: 37
End: 2022-06-27

## 2022-06-27 ENCOUNTER — OUTPATIENT (OUTPATIENT)
Dept: OUTPATIENT SERVICES | Facility: HOSPITAL | Age: 37
LOS: 1 days | End: 2022-06-27
Payer: MEDICAID

## 2022-06-27 DIAGNOSIS — K57.32 DIVERTICULITIS OF LARGE INTESTINE WITHOUT PERFORATION OR ABSCESS WITHOUT BLEEDING: ICD-10-CM

## 2022-06-27 PROCEDURE — 74177 CT ABD & PELVIS W/CONTRAST: CPT

## 2022-06-27 PROCEDURE — 74177 CT ABD & PELVIS W/CONTRAST: CPT | Mod: 26

## 2022-07-07 ENCOUNTER — APPOINTMENT (OUTPATIENT)
Dept: SURGERY | Facility: CLINIC | Age: 37
End: 2022-07-07

## 2022-07-07 VITALS
HEIGHT: 67 IN | WEIGHT: 294 LBS | HEART RATE: 62 BPM | DIASTOLIC BLOOD PRESSURE: 80 MMHG | SYSTOLIC BLOOD PRESSURE: 127 MMHG | BODY MASS INDEX: 46.15 KG/M2

## 2022-07-07 VITALS — TEMPERATURE: 97 F

## 2022-07-07 PROCEDURE — 99213 OFFICE O/P EST LOW 20 MIN: CPT

## 2022-07-07 NOTE — PHYSICAL EXAM
[No Rash or Lesion] : No rash or lesion [Alert] : alert [Oriented to Person] : oriented to person [Oriented to Place] : oriented to place [Oriented to Time] : oriented to time [Calm] : calm [de-identified] : A/Ox3; NAD. appears comfortable [de-identified] : EOMI; sclera anicteric.  [de-identified] : airway patent [de-identified] : normal HR [de-identified] : Abd is soft, nondistended, no rebound or guarding. No abdominal masses. No abdominal tenderness. [de-identified] : +ROM, normal gait

## 2022-07-07 NOTE — ASSESSMENT
[FreeTextEntry1] : Mr. PAN is a 36 year y/o M who presents with intramural fistulous tract without evidence of drainable collection.

## 2022-07-07 NOTE — HISTORY OF PRESENT ILLNESS
[de-identified] : Mr. PAN is a 36 year y/o M w PMHX prediabetes, admitted to [facility] with LLQ abdominal pain c/w diverticulitis, 03/14- 03/20/22.\par He presents to the office today for follow up visit, and had a follow up CT scan 06/27/22. He is seeing GI and has colonoscopy scheduled with Dr. Cosby.

## 2022-07-07 NOTE — PLAN
[FreeTextEntry1] : results of most recent imaging reviewed and findings were discussed w the patient \par follow up after colonoscopy with Dr. Cosby \par \par Patient's questions and concerns addressed to their satisfaction, and patient verbalized an understanding of the information discussed.\par

## 2022-07-12 LAB — SARS-COV-2 N GENE NPH QL NAA+PROBE: NOT DETECTED

## 2022-07-13 NOTE — ASU PATIENT PROFILE, ADULT - NSICDXPASTMEDICALHX_GEN_ALL_CORE_FT
PAST MEDICAL HISTORY:  No pertinent past medical history      PAST MEDICAL HISTORY:  Diverticulitis of intestine without perforation or abscess     Diverticulosis

## 2022-07-13 NOTE — ASU PATIENT PROFILE, ADULT - FALL HARM RISK - UNIVERSAL INTERVENTIONS
Bed in lowest position, wheels locked, appropriate side rails in place/Call bell, personal items and telephone in reach/Instruct patient to call for assistance before getting out of bed or chair/Non-slip footwear when patient is out of bed/[place] to call system/Physically safe environment - no spills, clutter or unnecessary equipment/Purposeful Proactive Rounding/Room/bathroom lighting operational, light cord in reach

## 2022-07-14 ENCOUNTER — APPOINTMENT (OUTPATIENT)
Dept: GASTROENTEROLOGY | Facility: HOSPITAL | Age: 37
End: 2022-07-14

## 2022-07-14 ENCOUNTER — RESULT REVIEW (OUTPATIENT)
Age: 37
End: 2022-07-14

## 2022-07-14 ENCOUNTER — OUTPATIENT (OUTPATIENT)
Dept: OUTPATIENT SERVICES | Facility: HOSPITAL | Age: 37
LOS: 1 days | End: 2022-07-14
Payer: MEDICAID

## 2022-07-14 ENCOUNTER — TRANSCRIPTION ENCOUNTER (OUTPATIENT)
Age: 37
End: 2022-07-14

## 2022-07-14 VITALS
RESPIRATION RATE: 18 BRPM | OXYGEN SATURATION: 100 % | HEART RATE: 68 BPM | SYSTOLIC BLOOD PRESSURE: 111 MMHG | DIASTOLIC BLOOD PRESSURE: 73 MMHG

## 2022-07-14 VITALS
SYSTOLIC BLOOD PRESSURE: 120 MMHG | DIASTOLIC BLOOD PRESSURE: 74 MMHG | OXYGEN SATURATION: 100 % | TEMPERATURE: 98 F | HEIGHT: 67 IN | WEIGHT: 294.98 LBS | RESPIRATION RATE: 18 BRPM | HEART RATE: 74 BPM

## 2022-07-14 DIAGNOSIS — K57.32 DIVERTICULITIS OF LARGE INTESTINE WITHOUT PERFORATION OR ABSCESS WITHOUT BLEEDING: ICD-10-CM

## 2022-07-14 PROCEDURE — 88305 TISSUE EXAM BY PATHOLOGIST: CPT | Mod: 26

## 2022-07-14 PROCEDURE — 88305 TISSUE EXAM BY PATHOLOGIST: CPT

## 2022-07-14 PROCEDURE — 45380 COLONOSCOPY AND BIOPSY: CPT

## 2022-07-14 RX ORDER — SODIUM CHLORIDE 9 MG/ML
500 INJECTION INTRAMUSCULAR; INTRAVENOUS; SUBCUTANEOUS
Refills: 0 | Status: DISCONTINUED | OUTPATIENT
Start: 2022-07-14 | End: 2022-07-28

## 2022-07-18 LAB — SURGICAL PATHOLOGY STUDY: SIGNIFICANT CHANGE UP

## 2022-07-29 PROBLEM — K57.92 DIVERTICULITIS OF INTESTINE, PART UNSPECIFIED, WITHOUT PERFORATION OR ABSCESS WITHOUT BLEEDING: Chronic | Status: ACTIVE | Noted: 2022-07-14

## 2022-07-29 PROBLEM — K57.90 DIVERTICULOSIS OF INTESTINE, PART UNSPECIFIED, WITHOUT PERFORATION OR ABSCESS WITHOUT BLEEDING: Chronic | Status: ACTIVE | Noted: 2022-07-14

## 2022-08-15 ENCOUNTER — APPOINTMENT (OUTPATIENT)
Dept: GASTROENTEROLOGY | Facility: CLINIC | Age: 37
End: 2022-08-15

## 2022-12-07 NOTE — PATIENT PROFILE ADULT - FALL HARM RISK - FACTORS NURSING JUDGEMENT
Patient needs new RTX orders. Concomitant DIL/SLE picture with ANCA +     Has done extremely well with all symptoms want to repeat RTX at 1gm on day 1 and day 14/15.   
No

## 2023-06-20 NOTE — CONSULT NOTE ADULT - REASON FOR ADMISSION
perforated sigmoid colon Mirvaso Pregnancy And Lactation Text: This medication has not been assigned a Pregnancy Risk Category. It is unknown if the medication is excreted in breast milk.

## 2023-09-22 NOTE — ED PROVIDER NOTE - IV ALTEPLASE EXCL ABS HIDDEN
show Clindamycin Counseling: I counseled the patient regarding use of clindamycin as an antibiotic for prophylactic and/or therapeutic purposes. Clindamycin is active against numerous classes of bacteria, including skin bacteria. Side effects may include nausea, diarrhea, gastrointestinal upset, rash, hives, yeast infections, and in rare cases, colitis.

## (undated) DEVICE — NDL INJ SCLERO INTERJECT 23G

## (undated) DEVICE — CONTAINER FORMALIN 10% 20ML

## (undated) DEVICE — FORCEP BIOPSY 2.5MM DISP

## (undated) DEVICE — KIT ENDO PROCEDURE CUST W/VLV

## (undated) DEVICE — TUBING MEDI-VAC W MAXIGRIP CONNECTORS 1/4"X6'

## (undated) DEVICE — RETRIEVER ROTH NET PLATINUM-UNIVERSAL

## (undated) DEVICE — CLAMP BX HOT RAD JAW 3

## (undated) DEVICE — SNARE LOOP POLY DISP 30MM LOOP

## (undated) DEVICE — RADIAL JAW 4 240CM WITH NDL

## (undated) DEVICE — SOLIDIFIER ISOLYZER 2000 CC

## (undated) DEVICE — Device

## (undated) DEVICE — SYR LUER LOK 50CC

## (undated) DEVICE — TUBING CANNULA SALTER LABS NASAL ADULT 7FT

## (undated) DEVICE — DRSG GAUZE 4X4"

## (undated) DEVICE — ADAPTER ENDO CHNL SINGLE USE

## (undated) DEVICE — CATH ELCTR GLIDE PRB 7FR

## (undated) DEVICE — SENSOR O2 FINGER ADULT 24/CA

## (undated) DEVICE — TUBING IV SET GRAVITY 3Y 100" MACRO

## (undated) DEVICE — SOL INJ NS 0.9% 500ML 1-PORT

## (undated) DEVICE — VALVE ENDOSCOPE DEFENDO SINGLE USE

## (undated) DEVICE — LUBE JELLY FOILPACK 36GM STERILE